# Patient Record
Sex: FEMALE | Race: WHITE | Employment: OTHER | URBAN - METROPOLITAN AREA
[De-identification: names, ages, dates, MRNs, and addresses within clinical notes are randomized per-mention and may not be internally consistent; named-entity substitution may affect disease eponyms.]

---

## 2017-11-29 ENCOUNTER — HOSPITAL ENCOUNTER (OUTPATIENT)
Dept: MAMMOGRAPHY | Age: 62
Discharge: HOME | End: 2017-11-29
Attending: INTERNAL MEDICINE
Payer: COMMERCIAL

## 2017-11-29 DIAGNOSIS — Z12.31 VISIT FOR SCREENING MAMMOGRAM: ICD-10-CM

## 2017-11-29 PROCEDURE — 77067 SCR MAMMO BI INCL CAD: CPT

## 2017-12-01 ENCOUNTER — OFFICE VISIT (OUTPATIENT)
Dept: INTERNAL MEDICINE CLINIC | Age: 62
End: 2017-12-01

## 2017-12-01 VITALS
HEIGHT: 65 IN | OXYGEN SATURATION: 98 % | RESPIRATION RATE: 10 BRPM | SYSTOLIC BLOOD PRESSURE: 140 MMHG | TEMPERATURE: 98.4 F | HEART RATE: 82 BPM | BODY MASS INDEX: 24.16 KG/M2 | WEIGHT: 145 LBS | DIASTOLIC BLOOD PRESSURE: 88 MMHG

## 2017-12-01 DIAGNOSIS — Z11.59 NEED FOR HEPATITIS C SCREENING TEST: ICD-10-CM

## 2017-12-01 DIAGNOSIS — N95.9 MENOPAUSAL PROBLEM: ICD-10-CM

## 2017-12-01 DIAGNOSIS — S92.909D CLOSED FRACTURE OF FOOT WITH ROUTINE HEALING, UNSPECIFIED LATERALITY, SUBSEQUENT ENCOUNTER: ICD-10-CM

## 2017-12-01 DIAGNOSIS — Z12.11 COLON CANCER SCREENING: ICD-10-CM

## 2017-12-01 DIAGNOSIS — N90.4 LICHEN SCLEROSUS OF FEMALE GENITALIA: ICD-10-CM

## 2017-12-01 DIAGNOSIS — Z00.00 ROUTINE GENERAL MEDICAL EXAMINATION AT A HEALTH CARE FACILITY: Primary | ICD-10-CM

## 2017-12-01 RX ORDER — CLOBETASOL PROPIONATE 0.5 MG/G
CREAM TOPICAL
Refills: 6 | COMMUNITY
Start: 2017-11-14 | End: 2020-10-26 | Stop reason: ALTCHOICE

## 2017-12-01 RX ORDER — DOXYCYCLINE HYCLATE 100 MG
TABLET ORAL
Refills: 5 | COMMUNITY
Start: 2017-10-29 | End: 2019-11-25

## 2017-12-01 NOTE — MR AVS SNAPSHOT
Visit Information Date & Time Provider Department Dept. Phone Encounter #  
 12/1/2017  4:00 PM Vikash Snowden MD [facility] Internal Medicine [phone] [id number] Upcoming Health Maintenance Date Due Hepatitis C Screening 1955 COLONOSCOPY 8/11/2015 PAP AKA CERVICAL CYTOLOGY 8/24/2016 Influenza Age 5 to Adult 8/1/2017 BREAST CANCER SCRN MAMMOGRAM 11/29/2019 DTaP/Tdap/Td series (2 - Td) 8/24/2021 Allergies as of 12/1/2017  Review Complete On: 12/1/2017 By: Vikash Snowden MD  
  
 Severity Noted Reaction Type Reactions Morphine  05/21/2014    Nausea Only Zithromax [Azithromycin]  12/16/2009    Nausea Only Current Immunizations  Reviewed on 12/1/2017 Name Date TDAP Vaccine 8/24/2011, 12/17/2009 Zoster Vaccine, Live 12/1/2013 Reviewed by Vikash Snowden MD on 12/1/2017 at  4:37 PM  
 Reviewed by Vikash Snowden MD on 12/1/2017 at  4:38 PM  
You Were Diagnosed With   
  
 Codes Comments Routine general medical examination at a health care facility    -  Primary ICD-10-CM: Z00.00 ICD-9-CM: V70.0 Colon cancer screening     ICD-10-CM: Z12.11 ICD-9-CM: V76.51 Need for hepatitis C screening test     ICD-10-CM: Z11.59 
ICD-9-CM: V73.89 Lichen sclerosus of female genitalia     ICD-10-CM: N90.4 ICD-9-CM: 701.0 Menopause present     ICD-10-CM: Z78.0 ICD-9-CM: 627.2 Closed fracture of foot with routine healing, unspecified laterality, subsequent encounter     ICD-10-CM: S92.909D ICD-9-CM: V54.16 Vitals BP Pulse Temp Resp Height(growth percentile) Weight(growth percentile) 140/88 82 98.4 °F (36.9 °C) (Oral) 10 5' 4.5\" (1.638 m) 145 lb (65.8 kg) SpO2 BMI OB Status Smoking Status 98% 24.5 kg/m2 Postmenopausal Never Smoker Vitals History BMI and BSA Data Body Mass Index Body Surface Area 24.5 kg/m 2 1.73 m 2 Preferred Pharmacy Pharmacy Name Phone [facility] [address] [phone] Your Updated Medication List  
  
   
This list is accurate as of: 12/1/17  4:51 PM.  Always use your most recent med list.  
  
  
  
  
 aspirin delayed-release 81 mg tablet Take 81 mg by mouth daily. B COMPLEX 1 tablet Generic drug:  b complex vitamins Take 1 Tab by mouth daily. clobetasol 0.05 % topical cream  
Commonly known as:  TEMOVATE  
APPLY TOPICALLY TO AFFECTED AREA TWICE DAILY  
  
 doxycycline 100 mg tablet Commonly known as:  VIBRA-TABS TAKE 1 TABLET TWICE A DAY  
  
 VITAMIN D3 1,000 unit tablet Generic drug:  cholecalciferol Take 1,000 Units by mouth daily. We Performed the Following CBC WITH AUTOMATED DIFF [57690 CPT(R)] HEPATITIS C AB [44572 CPT(R)] LIPID PANEL [70624 CPT(R)] METABOLIC PANEL, COMPREHENSIVE [19033 CPT(R)] REFERRAL TO OBSTETRICS AND GYNECOLOGY [REF51 Custom] TSH RFX ON ABNORMAL TO FREE T4 [[id number] Custom] UA/M W/RFLX CULTURE, ROUTINE [[id number] Custom] To-Do List   
 12/01/2017 Imaging:  DEXA BONE DENSITY STUDY AXIAL Referral Information Referral ID Referred By Referred To  
  
 [id number] David Hernandez, [address] [facility] [address] [place], [address] Visits Status Start Date End Date 1 New Request 12/1/17 12/1/18 If your referral has a status of pending review or denied, additional information will be sent to support the outcome of this decision. Referral ID Referred By Referred To  
 [id number] Zack WHITEHEAD Not Available Visits Status Start Date End Date 1 New Request 12/1/17 12/1/18 If your referral has a status of pending review or denied, additional information will be sent to support the outcome of this decision. Introducing [facility]! Dear Russ Mack: Thank you for requesting a giftee account.   Our records indicate that you already have an active Wyle account. You can access your account anytime at [email or web address] Did you know that you can access your hospital and ER discharge instructions at any time in Wyle? You can also review all of your test results from your hospital stay or ER visit. Additional Information If you have questions, please visit the Frequently Asked Questions section of the Wyle website at [email or web address] Remember, Wyle is NOT to be used for urgent needs. For medical emergencies, dial 911. Now available from your iPhone and Android! Please provide this summary of care documentation to your next provider. Your primary care clinician is listed as Lilliam [phone] If you have any questions after today's visit, please call [phone].

## 2017-12-02 NOTE — PROGRESS NOTES
Subjective:   58 y.o. female for Well Woman Check. Her gyne and breast care is done elsewhere by her Ob-Gyne physician. Patient Active Problem List    Diagnosis Date Noted    Pelvic relaxation 05/30/2014    Mixed hyperlipidemia 12/17/2009    Headache(784.0) 12/17/2009     Current Outpatient Prescriptions   Medication Sig Dispense Refill    clobetasol (TEMOVATE) 0.05 % topical cream APPLY TOPICALLY TO AFFECTED AREA TWICE DAILY  6    doxycycline (VIBRA-TABS) 100 mg tablet TAKE 1 TABLET TWICE A DAY  5    b complex vitamins (B COMPLEX 1) tablet Take 1 Tab by mouth daily.  cholecalciferol (VITAMIN D3) 1,000 unit tablet Take 1,000 Units by mouth daily.  aspirin delayed-release 81 mg tablet Take 81 mg by mouth daily.        Allergies   Allergen Reactions    Morphine Nausea Only    Zithromax [Azithromycin] Nausea Only     Past Medical History:   Diagnosis Date    GERD (gastroesophageal reflux disease)     HX    Headache(784.0) 12/17/2009    Mixed hyperlipidemia 12/17/2009    Rosacea 2017     Past Surgical History:   Procedure Laterality Date    HX BLADDER SUSPENSION  5/2014    HX DILATION AND CURETTAGE      HX TUBAL LIGATION      TITA US BX BREAST LT 1ST LESION W/CLIP AND SPECIMEN Left 12/09/2016    benign     Family History   Problem Relation Age of Onset    Heart Disease Mother      MI   [employer] Cancer Father      Unknown primary    Anesth Problems Neg Hx      Social History   Substance Use Topics    Smoking status: Never Smoker    Smokeless tobacco: Never Used    Alcohol use 1.0 oz/week     2 Glasses of wine per week      Comment: WEEKLY        Lab Results   Component Value Date/Time    WBC 11.9 06/03/2014 06:27 AM    HGB 11.9 06/03/2014 06:27 AM    HCT 35.9 06/03/2014 06:27 AM    PLATELET 550 77/54/2790 06:27 AM    MCV 91.1 06/03/2014 06:27 AM     Lab Results   Component Value Date/Time    Cholesterol, total 202 06/02/2015 09:38 AM    HDL Cholesterol 46 06/02/2015 09:38 AM    LDL, calculated 132 06/02/2015 09:38 AM    Triglyceride 121 06/02/2015 09:38 AM    CHOL/HDL Ratio 3.9 12/18/2009 07:45 AM     Lab Results   Component Value Date/Time    ALT (SGPT) 15 12/22/2014 02:28 PM    AST (SGOT) 17 12/22/2014 02:28 PM    Alk. phosphatase 83 12/22/2014 02:28 PM    Bilirubin, total 0.8 12/22/2014 02:28 PM    Albumin 4.4 12/22/2014 02:28 PM    Protein, total 6.8 12/22/2014 02:28 PM    PLATELET 740 93/03/2932 06:27 AM       Lab Results   Component Value Date/Time    GFR est non-AA 92 12/22/2014 02:28 PM    GFR est  12/22/2014 02:28 PM    Creatinine 0.72 12/22/2014 02:28 PM    BUN 11 12/22/2014 02:28 PM    Sodium 143 12/22/2014 02:28 PM    Potassium 4.2 12/22/2014 02:28 PM    Chloride 100 12/22/2014 02:28 PM    CO2 24 12/22/2014 02:28 PM    Magnesium 2.1 12/22/2014 02:28 PM     Lab Results   Component Value Date/Time    TSH 1.700 12/22/2014 02:31 PM    T4, Total 9.1 12/22/2014 02:31 PM      Lab Results   Component Value Date/Time    Glucose 90 12/22/2014 02:28 PM                           Specific concerns today: Her recent foot fracture is well healed. Seeing the GYN regularly as well as up to date on her colonoscopy. .    Review of Systems  A comprehensive review of systems was negative except for that written in the HPI. Objective:   Blood pressure 140/88, pulse 82, temperature 98.4 °F (36.9 °C), temperature source Oral, resp. rate 10, height 5' 4.5\" (1.638 m), weight 145 lb (65.8 kg), SpO2 98 %.    Physical Examination:   General appearance - alert, well appearing, and in no distress  Eyes - pupils equal and reactive, extraocular eye movements intact  Ears - bilateral TM's and external ear canals normal  Nose - normal and patent, no erythema, discharge or polyps  Mouth - mucous membranes moist, pharynx normal without lesions  Neck - supple, no significant adenopathy  Lymphatics - no palpable lymphadenopathy, no hepatosplenomegaly  Chest - clear to auscultation, no wheezes, rales or rhonchi, symmetric air entry  Heart - normal rate, regular rhythm, normal S1, S2, no murmurs, rubs, clicks or gallops  Abdomen - soft, nontender, nondistended, no masses or organomegaly  Back exam - full range of motion, no tenderness, palpable spasm or pain on motion  Neurological - alert, oriented, normal speech, no focal findings or movement disorder noted  Musculoskeletal - no joint tenderness, deformity or swelling  Extremities - peripheral pulses normal, no pedal edema, no clubbing or cyanosis     Assessment/Plan:     lose weight, increase physical activity, routine labs ordered  Diagnoses and all orders for this visit:    1. Routine general medical examination at a health care facility  -     CBC WITH AUTOMATED DIFF  -     METABOLIC PANEL, COMPREHENSIVE  -     LIPID PANEL  -     TSH RFX ON ABNORMAL TO FREE T4  -     UA/M W/RFLX CULTURE, ROUTINE  -     HEPATITIS C AB    2. Colon cancer screening    3. Need for hepatitis C screening test    4. Lichen sclerosus of female genitalia  -     REFERRAL TO OBSTETRICS AND GYNECOLOGY    5. Closed fracture of foot with routine healing, unspecified laterality, subsequent encounter    6. Menopausal problem  -     DEXA BONE DENSITY STUDY AXIAL; Future       Follow-up Disposition: 12 months and as needed. Discussed new Shingrix and will get it when available. Advised her to call back or return to office if symptoms worsen/change/persist.  Discussed expected course/resolution/complications of diagnosis in detail with patient. Medication risks/benefits/costs/interactions/alternatives discussed with patient. She was given an after visit summary which includes diagnoses, current medications, & vitals. She expressed understanding with the diagnosis and plan.

## 2017-12-06 LAB
ALBUMIN SERPL-MCNC: 4 G/DL (ref 3.6–4.8)
ALBUMIN/GLOB SERPL: 1.9 {RATIO} (ref 1.2–2.2)
ALP SERPL-CCNC: 82 IU/L (ref 39–117)
ALT SERPL-CCNC: 20 IU/L (ref 0–32)
APPEARANCE UR: CLEAR
AST SERPL-CCNC: 19 IU/L (ref 0–40)
BACTERIA #/AREA URNS HPF: NORMAL /[HPF]
BASOPHILS # BLD AUTO: 0 X10E3/UL (ref 0–0.2)
BASOPHILS NFR BLD AUTO: 1 %
BILIRUB SERPL-MCNC: 0.8 MG/DL (ref 0–1.2)
BILIRUB UR QL STRIP: NEGATIVE
BUN SERPL-MCNC: 14 MG/DL (ref 8–27)
BUN/CREAT SERPL: 20 (ref 12–28)
CALCIUM SERPL-MCNC: 9.1 MG/DL (ref 8.7–10.3)
CASTS URNS QL MICRO: NORMAL /LPF
CHLORIDE SERPL-SCNC: 101 MMOL/L (ref 96–106)
CHOLEST SERPL-MCNC: 169 MG/DL (ref 100–199)
CO2 SERPL-SCNC: 25 MMOL/L (ref 18–29)
COLOR UR: YELLOW
CREAT SERPL-MCNC: 0.69 MG/DL (ref 0.57–1)
EOSINOPHIL # BLD AUTO: 0.2 X10E3/UL (ref 0–0.4)
EOSINOPHIL NFR BLD AUTO: 3 %
EPI CELLS #/AREA URNS HPF: NORMAL /HPF
ERYTHROCYTE [DISTWIDTH] IN BLOOD BY AUTOMATED COUNT: 14.1 % (ref 12.3–15.4)
GFR SERPLBLD CREATININE-BSD FMLA CKD-EPI: 108 ML/MIN/1.73
GFR SERPLBLD CREATININE-BSD FMLA CKD-EPI: 94 ML/MIN/1.73
GLOBULIN SER CALC-MCNC: 2.1 G/DL (ref 1.5–4.5)
GLUCOSE SERPL-MCNC: 87 MG/DL (ref 65–99)
GLUCOSE UR QL: NEGATIVE
HCT VFR BLD AUTO: 39.9 % (ref 34–46.6)
HCV AB S/CO SERPL IA: <0.1 S/CO RATIO (ref 0–0.9)
HDLC SERPL-MCNC: 46 MG/DL
HGB BLD-MCNC: 13.3 G/DL (ref 11.1–15.9)
HGB UR QL STRIP: ABNORMAL
IMM GRANULOCYTES # BLD: 0 X10E3/UL (ref 0–0.1)
IMM GRANULOCYTES NFR BLD: 0 %
KETONES UR QL STRIP: NEGATIVE
LDLC SERPL CALC-MCNC: 107 MG/DL (ref 0–99)
LEUKOCYTE ESTERASE UR QL STRIP: NEGATIVE
LYMPHOCYTES # BLD AUTO: 2 X10E3/UL (ref 0.7–3.1)
LYMPHOCYTES NFR BLD AUTO: 29 %
MCH RBC QN AUTO: 30.6 PG (ref 26.6–33)
MCHC RBC AUTO-ENTMCNC: 33.3 G/DL (ref 31.5–35.7)
MCV RBC AUTO: 92 FL (ref 79–97)
MICRO URNS: ABNORMAL
MONOCYTES # BLD AUTO: 0.4 X10E3/UL (ref 0.1–0.9)
MONOCYTES NFR BLD AUTO: 6 %
MUCOUS THREADS URNS QL MICRO: PRESENT
NEUTROPHILS # BLD AUTO: 4.3 X10E3/UL (ref 1.4–7)
NEUTROPHILS NFR BLD AUTO: 61 %
NITRITE UR QL STRIP: NEGATIVE
PH UR STRIP: 7 [PH] (ref 5–7.5)
PLATELET # BLD AUTO: 221 X10E3/UL (ref 150–379)
POTASSIUM SERPL-SCNC: 4.2 MMOL/L (ref 3.5–5.2)
PROT SERPL-MCNC: 6.1 G/DL (ref 6–8.5)
PROT UR QL STRIP: NEGATIVE
RBC # BLD AUTO: 4.35 X10E6/UL (ref 3.77–5.28)
RBC #/AREA URNS HPF: NORMAL /HPF
SODIUM SERPL-SCNC: 141 MMOL/L (ref 134–144)
SP GR UR: 1.01 (ref 1–1.03)
TRIGL SERPL-MCNC: 80 MG/DL (ref 0–149)
TSH SERPL DL<=0.005 MIU/L-ACNC: 2.68 UIU/ML (ref 0.45–4.5)
URINALYSIS REFLEX, 377202: ABNORMAL
UROBILINOGEN UR STRIP-MCNC: 0.2 MG/DL (ref 0.2–1)
VLDLC SERPL CALC-MCNC: 16 MG/DL (ref 5–40)
WBC # BLD AUTO: 7 X10E3/UL (ref 3.4–10.8)
WBC #/AREA URNS HPF: NORMAL /HPF

## 2017-12-13 ENCOUNTER — HOSPITAL ENCOUNTER (OUTPATIENT)
Dept: MAMMOGRAPHY | Age: 62
Discharge: HOME | End: 2017-12-13
Attending: INTERNAL MEDICINE
Payer: COMMERCIAL

## 2017-12-13 DIAGNOSIS — N95.9 MENOPAUSAL PROBLEM: ICD-10-CM

## 2017-12-13 PROCEDURE — 77080 DXA BONE DENSITY AXIAL: CPT

## 2018-05-01 ENCOUNTER — PATIENT MESSAGE (OUTPATIENT)
Dept: INTERNAL MEDICINE CLINIC | Age: 63
End: 2018-05-01

## 2018-05-01 DIAGNOSIS — R31.9 HEMATURIA, UNSPECIFIED TYPE: Primary | ICD-10-CM

## 2018-05-01 NOTE — TELEPHONE ENCOUNTER
From: Malgorzata Boudreaux  Sent: 5/1/2018 11:01 AM EDT  To: Nilton Leach [facility]  Subject: RE: Non-Urgent Medical Question    Sounds good. Let me know and I will stop by. On another note, I have gone a couple more times this morning and the urine is clear. Thanks for your response. Nu  ----- Message -----  From: Mor Lord MD  Sent: 5/1/2018 10:26 AM EDT  To: Yennifer Albarran. [place]  Subject: RE: Non-Urgent Medical Question    At least come by and give a urine specimen. Let me know and I will prepare a lab slip. Barb Barnett    ----- Message -----   From: Elisabet Solis Lisbeth Darnells: 5/1/2018 7:33 AM EDT   To: Mor Lord MD  Subject: Non-Urgent Medical Question    Hi Dr. Shahana Roberts, late in the day, I noticed blood in my urine, enough to make it a pink color. This occurred two times in a row when I went to the bathroom. I have gone a few times since that time and my urine has been clear. I am not having any other symptoms. I am not having any burning when I urinate and no other pain. I was pretty active yesterday working in the yard, so my back was tired but not hurting. Should I make an appointment to come see you?      Thanks    Osmany  [phone]

## 2018-05-02 LAB
APPEARANCE UR: CLEAR
BACTERIA #/AREA URNS HPF: ABNORMAL /[HPF]
BILIRUB UR QL STRIP: NEGATIVE
CASTS URNS QL MICRO: ABNORMAL /LPF
COLOR UR: YELLOW
CRYSTALS URNS MICRO: ABNORMAL
EPI CELLS #/AREA URNS HPF: ABNORMAL /HPF
GLUCOSE UR QL: NEGATIVE
HGB UR QL STRIP: ABNORMAL
KETONES UR QL STRIP: NEGATIVE
LEUKOCYTE ESTERASE UR QL STRIP: NEGATIVE
MICRO URNS: ABNORMAL
MUCOUS THREADS URNS QL MICRO: PRESENT
NITRITE UR QL STRIP: NEGATIVE
PH UR STRIP: 6.5 [PH] (ref 5–7.5)
PROT UR QL STRIP: NEGATIVE
RBC #/AREA URNS HPF: ABNORMAL /HPF
SP GR UR: 1.02 (ref 1–1.03)
UNIDENT CRYS URNS QL MICRO: PRESENT
URINALYSIS REFLEX, 377202: ABNORMAL
UROBILINOGEN UR STRIP-MCNC: 0.2 MG/DL (ref 0.2–1)
WBC #/AREA URNS HPF: ABNORMAL /HPF

## 2018-05-04 ENCOUNTER — HOSPITAL ENCOUNTER (EMERGENCY)
Age: 63
Discharge: HOME | End: 2018-05-04
Attending: STUDENT IN AN ORGANIZED HEALTH CARE EDUCATION/TRAINING PROGRAM
Payer: COMMERCIAL

## 2018-05-04 ENCOUNTER — TELEPHONE (OUTPATIENT)
Dept: INTERNAL MEDICINE CLINIC | Age: 63
End: 2018-05-04

## 2018-05-04 ENCOUNTER — APPOINTMENT (OUTPATIENT)
Dept: GENERAL RADIOLOGY | Age: 63
End: 2018-05-04
Attending: PHYSICIAN ASSISTANT
Payer: COMMERCIAL

## 2018-05-04 VITALS
WEIGHT: 143 LBS | TEMPERATURE: 98.8 F | DIASTOLIC BLOOD PRESSURE: 93 MMHG | OXYGEN SATURATION: 99 % | HEART RATE: 82 BPM | BODY MASS INDEX: 23.82 KG/M2 | HEIGHT: 65 IN | SYSTOLIC BLOOD PRESSURE: 148 MMHG | RESPIRATION RATE: 16 BRPM

## 2018-05-04 DIAGNOSIS — R07.89 ATYPICAL CHEST PAIN: ICD-10-CM

## 2018-05-04 DIAGNOSIS — R03.0 ELEVATED BLOOD PRESSURE READING: Primary | ICD-10-CM

## 2018-05-04 LAB
ALBUMIN SERPL-MCNC: 4 G/DL (ref 3.5–5)
ALBUMIN/GLOB SERPL: 1.3 {RATIO} (ref 1.1–2.2)
ALP SERPL-CCNC: 89 U/L (ref 45–117)
ALT SERPL-CCNC: 21 U/L (ref 12–78)
ANION GAP SERPL CALC-SCNC: 7 MMOL/L (ref 5–15)
APPEARANCE UR: CLEAR
AST SERPL-CCNC: 15 U/L (ref 15–37)
ATRIAL RATE: 84 BPM
BACTERIA URNS QL MICRO: NEGATIVE /HPF
BASOPHILS # BLD: 0.1 K/UL (ref 0–0.1)
BASOPHILS NFR BLD: 1 % (ref 0–1)
BILIRUB SERPL-MCNC: 1.2 MG/DL (ref 0.2–1)
BILIRUB UR QL: NEGATIVE
BUN SERPL-MCNC: 17 MG/DL (ref 6–20)
BUN/CREAT SERPL: 25 (ref 12–20)
CALCIUM SERPL-MCNC: 9.3 MG/DL (ref 8.5–10.1)
CALCULATED P AXIS, ECG09: 65 DEGREES
CALCULATED R AXIS, ECG10: 41 DEGREES
CALCULATED T AXIS, ECG11: 52 DEGREES
CHLORIDE SERPL-SCNC: 107 MMOL/L (ref 97–108)
CO2 SERPL-SCNC: 29 MMOL/L (ref 21–32)
COLOR UR: NORMAL
CREAT SERPL-MCNC: 0.68 MG/DL (ref 0.55–1.02)
DIAGNOSIS, 93000: NORMAL
DIFFERENTIAL METHOD BLD: NORMAL
EOSINOPHIL # BLD: 0.2 K/UL (ref 0–0.4)
EOSINOPHIL NFR BLD: 2 % (ref 0–7)
EPITH CASTS URNS QL MICRO: NORMAL /LPF
ERYTHROCYTE [DISTWIDTH] IN BLOOD BY AUTOMATED COUNT: 13.8 % (ref 11.5–14.5)
GLOBULIN SER CALC-MCNC: 3.2 G/DL (ref 2–4)
GLUCOSE SERPL-MCNC: 100 MG/DL (ref 65–100)
GLUCOSE UR STRIP.AUTO-MCNC: NEGATIVE MG/DL
HCT VFR BLD AUTO: 42.5 % (ref 35–47)
HGB BLD-MCNC: 14 G/DL (ref 11.5–16)
HGB UR QL STRIP: NEGATIVE
HYALINE CASTS URNS QL MICRO: NORMAL /LPF (ref 0–5)
IMM GRANULOCYTES # BLD: 0 K/UL (ref 0–0.04)
IMM GRANULOCYTES NFR BLD AUTO: 0 % (ref 0–0.5)
KETONES UR QL STRIP.AUTO: NEGATIVE MG/DL
LEUKOCYTE ESTERASE UR QL STRIP.AUTO: NEGATIVE
LIPASE SERPL-CCNC: 101 U/L (ref 73–393)
LYMPHOCYTES # BLD: 1.6 K/UL (ref 0.8–3.5)
LYMPHOCYTES NFR BLD: 20 % (ref 12–49)
MCH RBC QN AUTO: 30.6 PG (ref 26–34)
MCHC RBC AUTO-ENTMCNC: 32.9 G/DL (ref 30–36.5)
MCV RBC AUTO: 92.8 FL (ref 80–99)
MONOCYTES # BLD: 0.6 K/UL (ref 0–1)
MONOCYTES NFR BLD: 7 % (ref 5–13)
NEUTS SEG # BLD: 5.8 K/UL (ref 1.8–8)
NEUTS SEG NFR BLD: 71 % (ref 32–75)
NITRITE UR QL STRIP.AUTO: NEGATIVE
NRBC # BLD: 0 K/UL (ref 0–0.01)
NRBC BLD-RTO: 0 PER 100 WBC
P-R INTERVAL, ECG05: 138 MS
PH UR STRIP: 6.5 [PH] (ref 5–8)
PLATELET # BLD AUTO: 202 K/UL (ref 150–400)
PMV BLD AUTO: 12.2 FL (ref 8.9–12.9)
POTASSIUM SERPL-SCNC: 3.3 MMOL/L (ref 3.5–5.1)
PROT SERPL-MCNC: 7.2 G/DL (ref 6.4–8.2)
PROT UR STRIP-MCNC: NEGATIVE MG/DL
Q-T INTERVAL, ECG07: 376 MS
QRS DURATION, ECG06: 88 MS
QTC CALCULATION (BEZET), ECG08: 444 MS
RBC # BLD AUTO: 4.58 M/UL (ref 3.8–5.2)
RBC #/AREA URNS HPF: NORMAL /HPF (ref 0–5)
SODIUM SERPL-SCNC: 143 MMOL/L (ref 136–145)
SP GR UR REFRACTOMETRY: 1.01 (ref 1–1.03)
TROPONIN I SERPL-MCNC: <0.04 NG/ML
UR CULT HOLD, URHOLD: NORMAL
UROBILINOGEN UR QL STRIP.AUTO: 0.2 EU/DL (ref 0.2–1)
VENTRICULAR RATE, ECG03: 84 BPM
WBC # BLD AUTO: 8.2 K/UL (ref 3.6–11)
WBC URNS QL MICRO: NORMAL /HPF (ref 0–4)

## 2018-05-04 PROCEDURE — 84484 ASSAY OF TROPONIN QUANT: CPT | Performed by: PHYSICIAN ASSISTANT

## 2018-05-04 PROCEDURE — 83690 ASSAY OF LIPASE: CPT | Performed by: PHYSICIAN ASSISTANT

## 2018-05-04 PROCEDURE — 74011250637 HC RX REV CODE- 250/637: Performed by: STUDENT IN AN ORGANIZED HEALTH CARE EDUCATION/TRAINING PROGRAM

## 2018-05-04 PROCEDURE — 81001 URINALYSIS AUTO W/SCOPE: CPT | Performed by: PHYSICIAN ASSISTANT

## 2018-05-04 PROCEDURE — 99284 EMERGENCY DEPT VISIT MOD MDM: CPT

## 2018-05-04 PROCEDURE — 85025 COMPLETE CBC W/AUTO DIFF WBC: CPT | Performed by: PHYSICIAN ASSISTANT

## 2018-05-04 PROCEDURE — 71046 X-RAY EXAM CHEST 2 VIEWS: CPT

## 2018-05-04 PROCEDURE — 80053 COMPREHEN METABOLIC PANEL: CPT | Performed by: PHYSICIAN ASSISTANT

## 2018-05-04 PROCEDURE — 93005 ELECTROCARDIOGRAM TRACING: CPT

## 2018-05-04 PROCEDURE — 36415 COLL VENOUS BLD VENIPUNCTURE: CPT | Performed by: PHYSICIAN ASSISTANT

## 2018-05-04 RX ORDER — ASPIRIN 325 MG
325 TABLET ORAL ONCE
Status: COMPLETED | OUTPATIENT
Start: 2018-05-04 | End: 2018-05-04

## 2018-05-04 RX ORDER — CLONIDINE HYDROCHLORIDE 0.1 MG/1
0.2 TABLET ORAL
Status: COMPLETED | OUTPATIENT
Start: 2018-05-04 | End: 2018-05-04

## 2018-05-04 RX ADMIN — CLONIDINE HYDROCHLORIDE 0.2 MG: 0.1 TABLET ORAL at 15:19

## 2018-05-04 RX ADMIN — ASPIRIN 325 MG: 325 TABLET ORAL at 15:19

## 2018-05-04 NOTE — ED PROVIDER NOTES
HPI Comments: 58 y.o. female with past medical history significant for mixed hyperlipidemia, GERD, and rosacea who presents from home with chief complaint of chest discomfort. Patient reports having some constant chest discomfort for 2 days. She describes the discomfort as a \"heaviness\" and states that it is \"similar to indigestion\". However, patient reports taking Tums without any relief. Patient states that the discomfort is non-pleuritic, non-exertional, and does not worsen with positional changes. She reports being seen by her PCP on Tuesday (3 days ago) after having hematuria for a couple days and had labs drawn. Since Tuesday, patient reports checking her blood pressure frequently and states that it has been slightly higher than normal (140/80). Today, patient states that her blood pressure was 154/100, which is much higher than usual. She denies having a formal diagnosis of hypertension. Patient complains of intermittent right sided headaches and states that they are usually present in the morning and relieve throughout the day. She reports that these headaches seem similar to sinus headaches and endorses recent seasonal allergies. Patient denies having any chest pain, leg swelling, or SOB. She denies recent travel. There are no other acute medical concerns at this time. Social hx: never smoker, + EtOH use (1 oz/wk), no drug use  PCP: Lin Hernández MD    Note written by Saba Holliday, as dictated by Luis Meyers MD 2:38 PM      The history is provided by the patient. No  was used.         Past Medical History:   Diagnosis Date    Fracture 2016    Right foot fx (pt turned foot while stepping off curb)    GERD (gastroesophageal reflux disease)     HX    Headache(784.0) 12/17/2009    Mixed hyperlipidemia 12/17/2009    Rosacea 2017       Past Surgical History:   Procedure Laterality Date    HX BLADDER SUSPENSION  5/2014    HX DILATION AND CURETTAGE      HX TUBAL LIGATION      TITA US BX BREAST LT 1ST LESION W/CLIP AND SPECIMEN Left 12/09/2016    benign         Family History:   Problem Relation Age of Onset    Heart Disease Mother      [facility] Cancer Father      Unknown primary    Anesth Problems Neg Hx        Social History     Social History    Marital status:      Spouse name: N/A    Number of children: N/A    Years of education: N/A     Occupational History    Not on file. Social History Main Topics    Smoking status: Never Smoker    Smokeless tobacco: Never Used    Alcohol use 1.0 oz/week     2 Glasses of wine per week      Comment: WEEKLY    Drug use: No    Sexual activity: Yes     Partners: Male     Other Topics Concern    Not on file     Social History Narrative         ALLERGIES: Morphine and Zithromax [azithromycin]    Review of Systems   Constitutional: Negative for chills and fever. HENT: Negative for sore throat. Respiratory: Negative for cough and shortness of breath. Cardiovascular: Negative for chest pain, palpitations and leg swelling. Chest discomfort   Gastrointestinal: Negative for abdominal pain and vomiting. Genitourinary: Negative for dysuria. Musculoskeletal: Negative for back pain. Skin: Negative for rash. Neurological: Negative for syncope and headaches. Psychiatric/Behavioral: Negative for confusion. All other systems reviewed and are negative. Vitals:    05/04/18 1402   BP: (!) 170/120   Pulse: 94   Resp: 15   Temp: 98.7 °F (37.1 °C)   SpO2: 98%   Weight: 64.9 kg (143 lb)   Height: 5' 4.5\" (1.638 m)            Physical Exam   Constitutional: She is oriented to person, place, and time. She appears well-developed. No distress. HENT:   Head: Normocephalic and atraumatic. Eyes: Conjunctivae and EOM are normal. Pupils are equal, round, and reactive to light. Neck: Normal range of motion. Neck supple. Cardiovascular: Normal rate, regular rhythm and normal heart sounds.     No murmur heard.  Pulmonary/Chest: Effort normal and breath sounds normal. No respiratory distress. Abdominal: Soft. Bowel sounds are normal. She exhibits no distension. There is no tenderness. There is no rebound. Musculoskeletal: Normal range of motion. She exhibits no edema. Neurological: She is alert and oriented to person, place, and time. No cranial nerve deficit. She exhibits normal muscle tone. Coordination normal.   Skin: Skin is warm and dry. No rash noted. Psychiatric: She has a normal mood and affect. Her behavior is normal.   Nursing note and vitals reviewed. Note written by Gerhardt Massa, Scribe, as dictated by Jason Mckeon MD 2:38 PM    [facility]      ED Course       Procedures  ED EKG interpretation:  Rhythm: normal sinus rhythm; and regular . Rate (approx.): 84; Axis: normal; ST/T wave: normal.  Note written by Gerhardt Massa, Scribe, as dictated by Jason Mckeon MD 2:08 PM      The patient is resting comfortably and feels better, is alert and in no distress. The repeat examination is unremarkable and benign. The electrocardiogram shows no signs of acute ischemia and the history, exam, diagnostic testing and current condition do not suggest that this patient is having an acute myocardial infarction (negative troponin after constant symptoms for 2 days), significant arrhythmia, unstable angina (low HEART score), esophageal perforation, pulmonary embolism, aortic dissection, pneumothorax, severe pneumonia, sepsis or other significant pathology that would warrant further testing, continued ED treatment, admission, or cardiology or other specialist consultation at this point. The vital signs have been stable. No evidence of end-organ damage. The patient's condition is stable and appropriate for discharge.  The patient will pursue further outpatient evaluation with the primary care physician, other designated physician or cardiologist. The patient and/or caregivers have expressed a clear and thorough understanding and agree to follow up as instructed.

## 2018-05-04 NOTE — ED NOTES
1:59 PM  I have evaluated the patient as the Provider in Triage. I have reviewed Her vital signs and the triage nurse assessment. I have talked with the patient and any available family and advised that I am the provider in triage and have ordered the appropriate study to initiate their work up based on the clinical presentation during my assessment. I have advised that the patient will be accommodated in the Main ED as soon as possible. I have also requested to contact the triage nurse or myself immediately if the patient experiences any changes in their condition during this brief waiting period. Chest tightness for a few days. Elevated BP at home. States that symptoms are improved with drinking water. Pt notes that she felt a little SOB when she went up the steps today but was able to mow the lawn yesterday without symptoms. Patient denies hx VTE, exogenous estrogen use, hemoptysis, unilateral leg pain/swelling.        HUAN Barba

## 2018-05-04 NOTE — TELEPHONE ENCOUNTER
Pt calling office with c/o left side chest pain that has been constant today. She has had \"indigestion\" for the ~2-3 days as well. BP elevated 154/100,174/100 with temporal headaches. Pt denies SOB, jaw pain, etc. To ED for eval with these s/sx. She states she will go to [facility] [place].

## 2018-05-04 NOTE — DISCHARGE INSTRUCTIONS
Elevated Blood Pressure: Care Instructions  Your Care Instructions    Blood pressure is a measure of how hard the blood pushes against the walls of your arteries. It's normal for blood pressure to go up and down throughout the day. But if it stays up over time, you have high blood pressure. Two numbers tell you your blood pressure. The first number is the systolic pressure. It shows how hard the blood pushes when your heart is pumping. The second number is the diastolic pressure. It shows how hard the blood pushes between heartbeats, when your heart is relaxed and filling with blood. An ideal blood pressure in adults is less than 120/80 (say \"120 over 80\"). High blood pressure is 140/90 or higher. You have high blood pressure if your top number is 140 or higher or your bottom number is 90 or higher, or both. The main test for high blood pressure is simple, fast, and painless. To diagnose high blood pressure, your doctor will test your blood pressure at different times. After testing your blood pressure, your doctor may ask you to test it again when you are home. If you are diagnosed with high blood pressure, you can work with your doctor to make a long-term plan to manage it. Follow-up care is a key part of your treatment and safety. Be sure to make and go to all appointments, and call your doctor if you are having problems. It's also a good idea to know your test results and keep a list of the medicines you take. How can you care for yourself at home? · Do not smoke. Smoking increases your risk for heart attack and stroke. If you need help quitting, talk to your doctor about stop-smoking programs and medicines. These can increase your chances of quitting for good. · Stay at a healthy weight. · Try to limit how much sodium you eat to less than 2,300 milligrams (mg) a day. Your doctor may ask you to try to eat less than 1,500 mg a day. · Be physically active.  Get at least 30 minutes of exercise on most days of the week. Walking is a good choice. You also may want to do other activities, such as running, swimming, cycling, or playing tennis or team sports. · Avoid or limit alcohol. Talk to your doctor about whether you can drink any alcohol. · Eat plenty of fruits, vegetables, and low-fat dairy products. Eat less saturated and total fats. · Learn how to check your blood pressure at home. When should you call for help? Call your doctor now or seek immediate medical care if:  ? · Your blood pressure is much higher than normal (such as 180/110 or higher). ? · You think high blood pressure is causing symptoms such as:  ¨ Severe headache. ¨ Blurry vision. ? Watch closely for changes in your health, and be sure to contact your doctor if:  ? · You do not get better as expected. Where can you learn more? Go to http://miltonApprovavahid.info/. Enter O643 in the search box to learn more about \"Elevated Blood Pressure: Care Instructions. \"  Current as of: September 21, 2016  Content Version: 11.4  © 3617-8367 [employer]. Care instructions adapted under license by [employer] (which disclaims liability or warranty for this information). If you have questions about a medical condition or this instruction, always ask your healthcare professional. [address] any warranty or liability for your use of this information. Chest Pain: Care Instructions  Your Care Instructions    There are many things that can cause chest pain. Some are not serious and will get better on their own in a few days. But some kinds of chest pain need more testing and treatment. Your doctor may have recommended a follow-up visit in the next 8 to 12 hours. If you are not getting better, you may need more tests or treatment. Even though your doctor has released you, you still need to watch for any problems.  The doctor carefully checked you, but sometimes problems can develop later. If you have new symptoms or if your symptoms do not get better, get medical care right away. If you have worse or different chest pain or pressure that lasts more than 5 minutes or you passed out (lost consciousness), call 911 or seek other emergency help right away. A medical visit is only one step in your treatment. Even if you feel better, you still need to do what your doctor recommends, such as going to all suggested follow-up appointments and taking medicines exactly as directed. This will help you recover and help prevent future problems. How can you care for yourself at home? · Rest until you feel better. · Take your medicine exactly as prescribed. Call your doctor if you think you are having a problem with your medicine. · Do not drive after taking a prescription pain medicine. When should you call for help? Call 911 if:  ? · You passed out (lost consciousness). ? · You have severe difficulty breathing. ? · You have symptoms of a heart attack. These may include:  ¨ Chest pain or pressure, or a strange feeling in your chest.  ¨ Sweating. ¨ Shortness of breath. ¨ Nausea or vomiting. ¨ Pain, pressure, or a strange feeling in your back, neck, jaw, or upper belly or in one or both shoulders or arms. ¨ Lightheadedness or sudden weakness. ¨ A fast or irregular heartbeat. After you call 911, the  may tell you to chew 1 adult-strength or 2 to 4 low-dose aspirin. Wait for an ambulance. Do not try to drive yourself. ?Call your doctor today if:  ? · You have any trouble breathing. ? · Your chest pain gets worse. ? · You are dizzy or lightheaded, or you feel like you may faint. ? · You are not getting better as expected. ? · You are having new or different chest pain. Where can you learn more? Go to http://milton-vahid.info/. Enter A120 in the search box to learn more about \"Chest Pain: Care Instructions. \"  Current as of: March 20, 2017  Content Version: 11.4  © 6242-7191 [employer]. Care instructions adapted under license by [employer] (which disclaims liability or warranty for this information). If you have questions about a medical condition or this instruction, always ask your healthcare professional. [address] any warranty or liability for your use of this information.

## 2018-05-05 PROCEDURE — 99284 EMERGENCY DEPT VISIT MOD MDM: CPT

## 2018-05-06 ENCOUNTER — APPOINTMENT (OUTPATIENT)
Dept: CT IMAGING | Age: 63
End: 2018-05-06
Attending: EMERGENCY MEDICINE
Payer: COMMERCIAL

## 2018-05-06 ENCOUNTER — HOSPITAL ENCOUNTER (OUTPATIENT)
Age: 63
Setting detail: OBSERVATION
Discharge: HOME | End: 2018-05-06
Attending: EMERGENCY MEDICINE | Admitting: INTERNAL MEDICINE
Payer: COMMERCIAL

## 2018-05-06 ENCOUNTER — APPOINTMENT (OUTPATIENT)
Dept: GENERAL RADIOLOGY | Age: 63
End: 2018-05-06
Attending: INTERNAL MEDICINE
Payer: COMMERCIAL

## 2018-05-06 VITALS
RESPIRATION RATE: 14 BRPM | SYSTOLIC BLOOD PRESSURE: 146 MMHG | OXYGEN SATURATION: 97 % | WEIGHT: 144.4 LBS | DIASTOLIC BLOOD PRESSURE: 94 MMHG | HEART RATE: 68 BPM | BODY MASS INDEX: 24.65 KG/M2 | TEMPERATURE: 98.4 F | HEIGHT: 64 IN

## 2018-05-06 DIAGNOSIS — R07.9 CHEST PAIN, UNSPECIFIED TYPE: Primary | ICD-10-CM

## 2018-05-06 PROBLEM — K21.9 GASTROESOPHAGEAL REFLUX DISEASE WITHOUT ESOPHAGITIS: Status: ACTIVE | Noted: 2018-05-06

## 2018-05-06 PROBLEM — I10 ESSENTIAL HYPERTENSION: Status: ACTIVE | Noted: 2018-05-06

## 2018-05-06 PROBLEM — R00.2 INTERMITTENT PALPITATIONS: Status: ACTIVE | Noted: 2018-05-06

## 2018-05-06 LAB
ALBUMIN SERPL-MCNC: 3.7 G/DL (ref 3.5–5)
ALBUMIN/GLOB SERPL: 1.4 {RATIO} (ref 1.1–2.2)
ALP SERPL-CCNC: 79 U/L (ref 45–117)
ALT SERPL-CCNC: 19 U/L (ref 12–78)
AMYLASE SERPL-CCNC: 40 U/L (ref 25–115)
ANION GAP SERPL CALC-SCNC: 7 MMOL/L (ref 5–15)
APPEARANCE UR: CLEAR
AST SERPL-CCNC: 12 U/L (ref 15–37)
ATRIAL RATE: 71 BPM
BACTERIA URNS QL MICRO: NEGATIVE /HPF
BASOPHILS # BLD: 0.1 K/UL (ref 0–0.1)
BASOPHILS NFR BLD: 1 % (ref 0–1)
BILIRUB SERPL-MCNC: 0.8 MG/DL (ref 0.2–1)
BILIRUB UR QL: NEGATIVE
BUN SERPL-MCNC: 20 MG/DL (ref 6–20)
BUN/CREAT SERPL: 25 (ref 12–20)
CALCIUM SERPL-MCNC: 8.7 MG/DL (ref 8.5–10.1)
CALCULATED P AXIS, ECG09: 32 DEGREES
CALCULATED R AXIS, ECG10: 22 DEGREES
CALCULATED T AXIS, ECG11: 34 DEGREES
CHLORIDE SERPL-SCNC: 108 MMOL/L (ref 97–108)
CK MB CFR SERPL CALC: NORMAL % (ref 0–2.5)
CK MB SERPL-MCNC: <1 NG/ML (ref 5–25)
CK SERPL-CCNC: 78 U/L (ref 26–192)
CO2 SERPL-SCNC: 29 MMOL/L (ref 21–32)
COLOR UR: NORMAL
CREAT SERPL-MCNC: 0.8 MG/DL (ref 0.55–1.02)
D DIMER PPP FEU-MCNC: 0.19 MG/L FEU (ref 0–0.65)
DIAGNOSIS, 93000: NORMAL
DIFFERENTIAL METHOD BLD: NORMAL
EOSINOPHIL # BLD: 0.2 K/UL (ref 0–0.4)
EOSINOPHIL NFR BLD: 2 % (ref 0–7)
EPITH CASTS URNS QL MICRO: NORMAL /LPF
ERYTHROCYTE [DISTWIDTH] IN BLOOD BY AUTOMATED COUNT: 13.7 % (ref 11.5–14.5)
GLOBULIN SER CALC-MCNC: 2.7 G/DL (ref 2–4)
GLUCOSE SERPL-MCNC: 100 MG/DL (ref 65–100)
GLUCOSE UR STRIP.AUTO-MCNC: NEGATIVE MG/DL
HCT VFR BLD AUTO: 37.3 % (ref 35–47)
HGB BLD-MCNC: 12.3 G/DL (ref 11.5–16)
HGB UR QL STRIP: NEGATIVE
HYALINE CASTS URNS QL MICRO: NORMAL /LPF (ref 0–5)
IMM GRANULOCYTES # BLD: 0 K/UL (ref 0–0.04)
IMM GRANULOCYTES NFR BLD AUTO: 0 % (ref 0–0.5)
KETONES UR QL STRIP.AUTO: NEGATIVE MG/DL
LEUKOCYTE ESTERASE UR QL STRIP.AUTO: NEGATIVE
LIPASE SERPL-CCNC: 111 U/L (ref 73–393)
LYMPHOCYTES # BLD: 2 K/UL (ref 0.8–3.5)
LYMPHOCYTES NFR BLD: 22 % (ref 12–49)
MAGNESIUM SERPL-MCNC: 2.3 MG/DL (ref 1.6–2.4)
MCH RBC QN AUTO: 30.8 PG (ref 26–34)
MCHC RBC AUTO-ENTMCNC: 33 G/DL (ref 30–36.5)
MCV RBC AUTO: 93.3 FL (ref 80–99)
MONOCYTES # BLD: 0.5 K/UL (ref 0–1)
MONOCYTES NFR BLD: 5 % (ref 5–13)
NEUTS SEG # BLD: 6.4 K/UL (ref 1.8–8)
NEUTS SEG NFR BLD: 70 % (ref 32–75)
NITRITE UR QL STRIP.AUTO: NEGATIVE
NRBC # BLD: 0 K/UL (ref 0–0.01)
NRBC BLD-RTO: 0 PER 100 WBC
P-R INTERVAL, ECG05: 156 MS
PH UR STRIP: 7.5 [PH] (ref 5–8)
PHOSPHATE SERPL-MCNC: 3.5 MG/DL (ref 2.6–4.7)
PLATELET # BLD AUTO: 161 K/UL (ref 150–400)
PMV BLD AUTO: 11.7 FL (ref 8.9–12.9)
POTASSIUM SERPL-SCNC: 3.5 MMOL/L (ref 3.5–5.1)
PROT SERPL-MCNC: 6.4 G/DL (ref 6.4–8.2)
PROT UR STRIP-MCNC: NEGATIVE MG/DL
Q-T INTERVAL, ECG07: 430 MS
QRS DURATION, ECG06: 86 MS
QTC CALCULATION (BEZET), ECG08: 467 MS
RBC # BLD AUTO: 4 M/UL (ref 3.8–5.2)
RBC #/AREA URNS HPF: NORMAL /HPF (ref 0–5)
SODIUM SERPL-SCNC: 144 MMOL/L (ref 136–145)
SP GR UR REFRACTOMETRY: 1.01 (ref 1–1.03)
TROPONIN I SERPL-MCNC: <0.04 NG/ML
TSH SERPL DL<=0.05 MIU/L-ACNC: 2.42 UIU/ML (ref 0.36–3.74)
UR CULT HOLD, URHOLD: NORMAL
UROBILINOGEN UR QL STRIP.AUTO: 0.2 EU/DL (ref 0.2–1)
VENTRICULAR RATE, ECG03: 71 BPM
WBC # BLD AUTO: 9.2 K/UL (ref 3.6–11)
WBC URNS QL MICRO: NORMAL /HPF (ref 0–4)

## 2018-05-06 PROCEDURE — 83735 ASSAY OF MAGNESIUM: CPT | Performed by: INTERNAL MEDICINE

## 2018-05-06 PROCEDURE — 74176 CT ABD & PELVIS W/O CONTRAST: CPT

## 2018-05-06 PROCEDURE — 82553 CREATINE MB FRACTION: CPT | Performed by: INTERNAL MEDICINE

## 2018-05-06 PROCEDURE — 85025 COMPLETE CBC W/AUTO DIFF WBC: CPT | Performed by: EMERGENCY MEDICINE

## 2018-05-06 PROCEDURE — 74011250637 HC RX REV CODE- 250/637: Performed by: INTERNAL MEDICINE

## 2018-05-06 PROCEDURE — 74011250637 HC RX REV CODE- 250/637: Performed by: SPECIALIST

## 2018-05-06 PROCEDURE — 96372 THER/PROPH/DIAG INJ SC/IM: CPT

## 2018-05-06 PROCEDURE — 84443 ASSAY THYROID STIM HORMONE: CPT | Performed by: INTERNAL MEDICINE

## 2018-05-06 PROCEDURE — 74011250636 HC RX REV CODE- 250/636: Performed by: INTERNAL MEDICINE

## 2018-05-06 PROCEDURE — 83690 ASSAY OF LIPASE: CPT | Performed by: INTERNAL MEDICINE

## 2018-05-06 PROCEDURE — 99218 HC RM OBSERVATION: CPT

## 2018-05-06 PROCEDURE — 84484 ASSAY OF TROPONIN QUANT: CPT | Performed by: EMERGENCY MEDICINE

## 2018-05-06 PROCEDURE — 81001 URINALYSIS AUTO W/SCOPE: CPT | Performed by: INTERNAL MEDICINE

## 2018-05-06 PROCEDURE — 93005 ELECTROCARDIOGRAM TRACING: CPT

## 2018-05-06 PROCEDURE — 80053 COMPREHEN METABOLIC PANEL: CPT | Performed by: EMERGENCY MEDICINE

## 2018-05-06 PROCEDURE — 84100 ASSAY OF PHOSPHORUS: CPT | Performed by: INTERNAL MEDICINE

## 2018-05-06 PROCEDURE — 85379 FIBRIN DEGRADATION QUANT: CPT | Performed by: INTERNAL MEDICINE

## 2018-05-06 PROCEDURE — 82150 ASSAY OF AMYLASE: CPT | Performed by: INTERNAL MEDICINE

## 2018-05-06 PROCEDURE — 36415 COLL VENOUS BLD VENIPUNCTURE: CPT | Performed by: INTERNAL MEDICINE

## 2018-05-06 PROCEDURE — 71045 X-RAY EXAM CHEST 1 VIEW: CPT

## 2018-05-06 RX ORDER — CARVEDILOL 6.25 MG/1
6.25 TABLET ORAL 2 TIMES DAILY WITH MEALS
Qty: 60 TAB | Refills: 0 | Status: SHIPPED | OUTPATIENT
Start: 2018-05-06 | End: 2018-05-18 | Stop reason: ALTCHOICE

## 2018-05-06 RX ORDER — BISACODYL 5 MG
5 TABLET, DELAYED RELEASE (ENTERIC COATED) ORAL DAILY PRN
Status: DISCONTINUED | OUTPATIENT
Start: 2018-05-06 | End: 2018-05-06 | Stop reason: HOSPADM

## 2018-05-06 RX ORDER — CLOBETASOL PROPIONATE 0.5 MG/G
CREAM TOPICAL 2 TIMES DAILY
Status: DISCONTINUED | OUTPATIENT
Start: 2018-05-06 | End: 2018-05-06 | Stop reason: SDUPTHER

## 2018-05-06 RX ORDER — ASPIRIN 81 MG/1
81 TABLET ORAL DAILY
Status: DISCONTINUED | OUTPATIENT
Start: 2018-05-06 | End: 2018-05-06 | Stop reason: HOSPADM

## 2018-05-06 RX ORDER — DOXYCYCLINE HYCLATE 100 MG
100 TABLET ORAL EVERY 12 HOURS
Status: DISCONTINUED | OUTPATIENT
Start: 2018-05-06 | End: 2018-05-06 | Stop reason: HOSPADM

## 2018-05-06 RX ORDER — SODIUM CHLORIDE 0.9 % (FLUSH) 0.9 %
5-10 SYRINGE (ML) INJECTION AS NEEDED
Status: DISCONTINUED | OUTPATIENT
Start: 2018-05-06 | End: 2018-05-06 | Stop reason: HOSPADM

## 2018-05-06 RX ORDER — PANTOPRAZOLE SODIUM 40 MG/1
40 TABLET, DELAYED RELEASE ORAL
Qty: 30 TAB | Refills: 1 | Status: SHIPPED | OUTPATIENT
Start: 2018-05-07 | End: 2018-05-06

## 2018-05-06 RX ORDER — HYDROCODONE BITARTRATE AND ACETAMINOPHEN 5; 325 MG/1; MG/1
1 TABLET ORAL
Status: DISCONTINUED | OUTPATIENT
Start: 2018-05-06 | End: 2018-05-06 | Stop reason: HOSPADM

## 2018-05-06 RX ORDER — ACETAMINOPHEN 325 MG/1
650 TABLET ORAL
Status: DISCONTINUED | OUTPATIENT
Start: 2018-05-06 | End: 2018-05-06 | Stop reason: HOSPADM

## 2018-05-06 RX ORDER — MORPHINE SULFATE 4 MG/ML
2 INJECTION, SOLUTION INTRAMUSCULAR; INTRAVENOUS
Status: DISCONTINUED | OUTPATIENT
Start: 2018-05-06 | End: 2018-05-06 | Stop reason: HOSPADM

## 2018-05-06 RX ORDER — AMLODIPINE BESYLATE 5 MG/1
10 TABLET ORAL DAILY
Status: DISCONTINUED | OUTPATIENT
Start: 2018-05-06 | End: 2018-05-06 | Stop reason: HOSPADM

## 2018-05-06 RX ORDER — ONDANSETRON 2 MG/ML
4 INJECTION INTRAMUSCULAR; INTRAVENOUS
Status: DISCONTINUED | OUTPATIENT
Start: 2018-05-06 | End: 2018-05-06 | Stop reason: HOSPADM

## 2018-05-06 RX ORDER — CARVEDILOL 6.25 MG/1
6.25 TABLET ORAL 2 TIMES DAILY WITH MEALS
Qty: 60 TAB | Refills: 0 | Status: SHIPPED | OUTPATIENT
Start: 2018-05-06 | End: 2018-05-06

## 2018-05-06 RX ORDER — HYDRALAZINE HYDROCHLORIDE 20 MG/ML
10 INJECTION INTRAMUSCULAR; INTRAVENOUS
Status: DISCONTINUED | OUTPATIENT
Start: 2018-05-06 | End: 2018-05-06 | Stop reason: HOSPADM

## 2018-05-06 RX ORDER — PANTOPRAZOLE SODIUM 40 MG/1
40 TABLET, DELAYED RELEASE ORAL
Status: DISCONTINUED | OUTPATIENT
Start: 2018-05-06 | End: 2018-05-06 | Stop reason: HOSPADM

## 2018-05-06 RX ORDER — CARVEDILOL 6.25 MG/1
6.25 TABLET ORAL 2 TIMES DAILY WITH MEALS
Status: DISCONTINUED | OUTPATIENT
Start: 2018-05-06 | End: 2018-05-06 | Stop reason: HOSPADM

## 2018-05-06 RX ORDER — PANTOPRAZOLE SODIUM 40 MG/1
40 TABLET, DELAYED RELEASE ORAL
Qty: 30 TAB | Refills: 1 | Status: SHIPPED | OUTPATIENT
Start: 2018-05-07 | End: 2018-05-18 | Stop reason: SDUPTHER

## 2018-05-06 RX ORDER — METOPROLOL TARTRATE 25 MG/1
25 TABLET, FILM COATED ORAL EVERY 12 HOURS
Status: DISCONTINUED | OUTPATIENT
Start: 2018-05-06 | End: 2018-05-06

## 2018-05-06 RX ORDER — ENOXAPARIN SODIUM 100 MG/ML
40 INJECTION SUBCUTANEOUS EVERY 24 HOURS
Status: DISCONTINUED | OUTPATIENT
Start: 2018-05-06 | End: 2018-05-06 | Stop reason: HOSPADM

## 2018-05-06 RX ORDER — MULTIVIT WITH MINERALS/HERBS
1 TABLET ORAL DAILY
Status: DISCONTINUED | OUTPATIENT
Start: 2018-05-06 | End: 2018-05-06 | Stop reason: HOSPADM

## 2018-05-06 RX ORDER — MELATONIN
1000 DAILY
Status: DISCONTINUED | OUTPATIENT
Start: 2018-05-06 | End: 2018-05-06 | Stop reason: HOSPADM

## 2018-05-06 RX ORDER — SODIUM CHLORIDE 0.9 % (FLUSH) 0.9 %
5-10 SYRINGE (ML) INJECTION EVERY 8 HOURS
Status: DISCONTINUED | OUTPATIENT
Start: 2018-05-06 | End: 2018-05-06 | Stop reason: HOSPADM

## 2018-05-06 RX ADMIN — Medication 10 ML: at 07:49

## 2018-05-06 RX ADMIN — PANTOPRAZOLE SODIUM 40 MG: 40 TABLET, DELAYED RELEASE ORAL at 10:44

## 2018-05-06 RX ADMIN — CARVEDILOL 6.25 MG: 6.25 TABLET, FILM COATED ORAL at 12:16

## 2018-05-06 RX ADMIN — ENOXAPARIN SODIUM 40 MG: 40 INJECTION SUBCUTANEOUS at 07:48

## 2018-05-06 RX ADMIN — VITAMIN D, TAB 1000IU (100/BT) 1000 UNITS: 25 TAB at 08:30

## 2018-05-06 RX ADMIN — ASPIRIN 81 MG: 81 TABLET, COATED ORAL at 10:44

## 2018-05-06 RX ADMIN — Medication 1 TABLET: at 08:30

## 2018-05-06 RX ADMIN — METOPROLOL TARTRATE 25 MG: 25 TABLET ORAL at 08:30

## 2018-05-06 RX ADMIN — DOXYCYCLINE HYCLATE 100 MG: 100 TABLET, COATED ORAL at 10:44

## 2018-05-06 NOTE — IP AVS SNAPSHOT
[address] [facility] [address] [place] [address] 
[phone] Patient: Sravani Hazel MRN: [id number] :1955 About your hospitalization You were admitted on:  May 6, 2018 You last received care in the:  [facility] 6S NEURO-SCI TELE You were discharged on:  May 6, 2018 Why you were hospitalized Your primary diagnosis was:  Chest Pain At Rest  
 Your diagnoses also included:  Gastroesophageal Reflux Disease Without Esophagitis, Essential Hypertension, Intermittent Palpitations Follow-up Information Follow up With Details Comments Contact Info Kristopher Friedman MD   [address] [place] [address] 
[phone] Your Scheduled Appointments Thursday May 10, 2018  3:45 PM EDT ROUTINE CARE with Kristopher Friedman MD  
[address] [facility] Internal Medicine [facility]) [address] [facility]  
[phone] Discharge Orders None A check mary indicates which time of day the medication should be taken. My Medications START taking these medications Instructions Each Dose to Equal  
 Morning Noon Evening Bedtime  
 carvedilol 6.25 mg tablet Commonly known as:  Tee Hirschfeld Your last dose was: Your next dose is: Take 1 Tab by mouth two (2) times daily (with meals). 6.25 mg  
    
   
   
   
  
 pantoprazole 40 mg tablet Commonly known as:  PROTONIX Start taking on:  2018 Your last dose was: Your next dose is: Take 1 Tab by mouth Daily (before breakfast). 40 mg CONTINUE taking these medications Instructions Each Dose to Equal  
 Morning Noon Evening Bedtime  
 aspirin delayed-release 81 mg tablet Your last dose was: Your next dose is: Take 81 mg by mouth daily. 81 mg  
    
   
   
   
  
 B COMPLEX 1 tablet Generic drug:  b complex vitamins Your last dose was: Your next dose is: Take 1 Tab by mouth daily. 1 Tab  
    
   
   
   
  
 clobetasol 0.05 % topical cream  
Commonly known as:  Sera Gabriel Your last dose was: Your next dose is:    
   
   
 APPLY TOPICALLY TO AFFECTED AREA TWICE DAILY  
     
   
   
   
  
 doxycycline 100 mg tablet Commonly known as:  VIBRA-TABS Your last dose was: Your next dose is: TAKE 1 TABLET TWICE A DAY  
     
   
   
   
  
 VITAMIN D3 1,000 unit tablet Generic drug:  cholecalciferol Your last dose was: Your next dose is: Take 1,000 Units by mouth daily. 1000 Units Where to Get Your Medications These medications were sent to [facility] [address] [place] [address], [address] [place] [address], [address] [place] [address] Phone:  [phone]  
  carvedilol 6.25 mg tablet  
 pantoprazole 40 mg tablet Discharge Instructions Discharge Instructions PATIENT ID: Feliz Loges MRN: [id number] YOB: 1955 DATE OF ADMISSION: 5/6/2018 12:03 AM   
DATE OF DISCHARGE: 5/6/2018 PRIMARY CARE PROVIDER: Bj Gates MD  
 
ATTENDING PHYSICIAN: Georgia Hawkins MD 
DISCHARGING PROVIDER: Georgia Hawkins MD   
To contact this individual call [phone] and ask the  to page. If unavailable ask to be transferred the Adult Hospitalist Department. DISCHARGE DIAGNOSES Chest pain Essential Hypertension Palpitations Hyperlipidemia Macroscopic Hematuria Chest Pain: Care Instructions Your Care Instructions There are many things that can cause chest pain. Some are not serious and will get better on their own in a few days. But some kinds of chest pain need more testing and treatment.  Your doctor may have recommended a follow-up visit in the next 8 to 12 hours. If you are not getting better, you may need more tests or treatment. Even though your doctor has released you, you still need to watch for any problems. The doctor carefully checked you, but sometimes problems can develop later. If you have new symptoms or if your symptoms do not get better, get medical care right away. If you have worse or different chest pain or pressure that lasts more than 5 minutes or you passed out (lost consciousness), call 911 or seek other emergency help right away. A medical visit is only one step in your treatment. Even if you feel better, you still need to do what your doctor recommends, such as going to all suggested follow-up appointments and taking medicines exactly as directed. This will help you recover and help prevent future problems. How can you care for yourself at home? · Rest until you feel better. · Take your medicine exactly as prescribed. Call your doctor if you think you are having a problem with your medicine. · Do not drive after taking a prescription pain medicine. When should you call for help? Call 911 if: 
? · You passed out (lost consciousness). ? · You have severe difficulty breathing. ? · You have symptoms of a heart attack. These may include: ¨ Chest pain or pressure, or a strange feeling in your chest. 
¨ Sweating. ¨ Shortness of breath. ¨ Nausea or vomiting. ¨ Pain, pressure, or a strange feeling in your back, neck, jaw, or upper belly or in one or both shoulders or arms. ¨ Lightheadedness or sudden weakness. ¨ A fast or irregular heartbeat. After you call 911, the  may tell you to chew 1 adult-strength or 2 to 4 low-dose aspirin. Wait for an ambulance. Do not try to drive yourself. ?Call your doctor today if: 
? · You have any trouble breathing. ? · Your chest pain gets worse. ? · You are dizzy or lightheaded, or you feel like you may faint. ? · You are not getting better as expected. ? · You are having new or different chest pain. Where can you learn more? Go to http://milton-vahid.info/. Enter A120 in the search box to learn more about \"Chest Pain: Care Instructions. \" Current as of: March 20, 2017 Content Version: 11.4 © 3340-7375 [employer]. Care instructions adapted under license by [employer] (which disclaims liability or warranty for this information). If you have questions about a medical condition or this instruction, always ask your healthcare professional. [place] [address] any warranty or liability for your use of this information. CONSULTATIONS: IP CONSULT TO CARDIOLOGY PROCEDURES/SURGERIES: * No surgery found * PENDING TEST RESULTS:  
At the time of discharge the following test results are still pending: None FOLLOW UP APPOINTMENTS:  
Follow-up Information Follow up With Details Comments Contact Info Ji Moreira MD   [address] [place] [address] [address] [place] [address] 
[phone] ADDITIONAL CARE RECOMMENDATIONS: None DIET: Cardiac Diet Oral Nutritional Supplements: No Oral Supplement prescribed ACTIVITY: Activity as tolerated WOUND CARE:  None EQUIPMENT needed: None DISCHARGE MEDICATIONS: 
 See Medication Reconciliation Form · It is important that you take the medication exactly as they are prescribed. · Keep your medication in the bottles provided by the pharmacist and keep a list of the medication names, dosages, and times to be taken in your wallet. · Do not take other medications without consulting your doctor. NOTIFY YOUR PHYSICIAN FOR ANY OF THE FOLLOWING:  
Fever over 101 degrees for 24 hours. Chest pain, shortness of breath, fever, chills, nausea, vomiting, diarrhea, change in mentation, falling, weakness, bleeding. Severe pain or pain not relieved by medications. Or, any other signs or symptoms that you may have questions about. DISPOSITION: 
  Home With: 
 OT  PT  HH  RN  
  
 SNF/Inpatient Rehab/LTAC  
X Independent/assisted living Hospice Other: CDMP Checked:  
Yes x PROBLEM LIST Updated: 
Yes x Signed:  
Wolfgang Roth MD 
5/6/2018 12:33 PM 
 
  
  
  
Introducing [facility]! Dear Ramakrishna General: Thank you for requesting a [employer] account. Our records indicate that you already have an active [employer] account. You can access your account anytime at [email or web address] Did you know that you can access your hospital and ER discharge instructions at any time in [employer]? You can also review all of your test results from your hospital stay or ER visit. Additional Information If you have questions, please visit the Frequently Asked Questions section of the [employer] website at [email or web address] Remember, [employer] is NOT to be used for urgent needs. For medical emergencies, dial 911. Now available from your iPhone and Android! Introducing [employer]oo As a [facility] patient, I wanted to make you aware of our electronic visit tool called [employer]. [facility] 24/7 allows you to connect within minutes with a medical provider 24 hours a day, seven days a week via a mobile device or tablet or logging into a secure website from your computer. You can access Cleve Boo from anywhere in the [place]. A virtual visit might be right for you when you have a simple condition and feel like you just dont want to get out of bed, or cant get away from work for an appointment, when your regular [facility] provider is not available (evenings, weekends or holidays), or when youre out of town and need minor care.   Electronic visits cost only $49 and if the Cleve Boo provider determines a prescription is needed to treat your condition, one can be electronically transmitted to a nearby pharmacy*. Please take a moment to enroll today if you have not already done so. The enrollment process is free and takes just a few minutes. To enroll, please download the [facility] 24/7 juan to your tablet or phone, or visit [email or web address] to enroll on your computer. And, as an [address] [place] [address] patient with a [employer] account, the results of your visits will be scanned into your electronic medical record and your primary care provider will be able to view the scanned results. We urge you to continue to see your regular [employer] provider for your ongoing medical care. And while your primary care provider may not be the one available when you seek a [employer] virtual visit, the peace of mind you get from getting a real diagnosis real time can be priceless. For more information on [facility] [employer], view our Frequently Asked Questions (FAQs) at [email or web address]. Sincerely, 
 
Jennifer Alexandre MD 
Chief Medical Officer [employer] *:  certain medications cannot be prescribed via [employer] Providers Seen During Your Hospitalization Provider Specialty Primary office phone Lorri Morales MD Emergency Medicine [phone] Nikki Pompa MD Internal Medicine [phone] Jerrilyn Soulier, MD Internal Medicine [phone] Your Primary Care Physician (PCP) Primary Care Physician Office Phone Office Fax [address], [place] [phone] You are allergic to the following Allergen Reactions Morphine Nausea Only Zithromax (Azithromycin) Nausea Only Recent Documentation Height Weight Breastfeeding? BMI OB Status Smoking Status 1.626 m 65.5 kg No 24.79 kg/m2 Postmenopausal Never Smoker Emergency Contacts Name Discharge Info Relation Home Work Mobile Augusta Fernandes CAREGIVER [3] Spouse [3] [phone] Patient Belongings The following personal items are in your possession at time of discharge: 
  Dental Appliances: None  Visual Aid: None      Home Medications: None   Jewelry: None  Clothing: At bedside    Other Valuables: None  Personal Items Sent to Safe: None Please provide this summary of care documentation to your next provider. Signatures-by signing, you are acknowledging that this After Visit Summary has been reviewed with you and you have received a copy. Patient Signature:  ____________________________________________________________ Date:  ____________________________________________________________  
  
[facility] Provider Signature:  ____________________________________________________________ Date:  ____________________________________________________________

## 2018-05-06 NOTE — PROGRESS NOTES
Problem: Hypertension  Goal: *Blood pressure within specified parameters  Outcome: Progressing Towards Goal  BP within specified parameters. Consult for cardiology called.

## 2018-05-06 NOTE — PROGRESS NOTES
Hospitalist Progress Note  Ruthann Sanz MD  Answering service: [phone] OR [phone] from in house phone        Date of Service:  2018  NAME:  Edmund SWENSON:  1955  MRN:  [id number]      Admission Summary:   The patient is 58year old  female who present with a history of atypical chest discomfort and macroscopic hematuria. The patient reports that the pain is intermittent and denies radiation or associated symptoms. She also denies shortness of breath or RODGERS. Previously evaluated by Dr. Oscar Quintana from cardiology approximately 3 years PTA. Work-up, including an EST was negative. Evaluated in the ED and returned within 24 hours because of BP abnormalities. Denies a prior history of essential hypertension. Interval history / Subjective:    Alert and oriented without complaints. Denies any chest discomfort. However, she reports that her BP has been noted to be approximately 140/90 mmhg by her PCP in his office. She claims that he has chosen not to medicate her. She also reports a h/o palpitations, prior to admission. Denies a history of atrial or ventricular arrhythmias. No diagnosis of thyroid disease. Positive h/o GERD. Assessment & Plan:     1. Chest pain - appears atypical based on the history an physical examination. In fact, the patient does not even identify it as chest pain, only a mild discomfort following meals in a patient with known GERD. 12 lead EKG's - negative; Cardiac markers - negative. Prior work-up approximately 3 years PTA. Cardiology consultation ordered and once cleared, the patient can have an outpatient EST, if required. However, she also reports a h/o palpitations. Nothing noted since admission. TSH is pending. Can also have a reveal  Monitoring device placed as an outpatient if cardiology believes she may be symptomatic and undocumented arrhthymias.       2.  Stage 3 HTN- BP's of 140/90 mmhg currently recommendations are for therapy. As it appears that the patient has atypical chest pain and did not really respond to the beta blocker, have discontinued the metoprolol and starting the patient on a course of amlodipine - 10mg/daily for BP control. 3.  Macroscopic Hematuria - CT scan of the abdomen and pelvis with a non obstructing renal calculi and no hydronephrosis. H and H is stable  Outpatient evaluation with urology scheduled for Monday - 5/7/18. Discharge planning once cardiology consultation is complete. Code status: FULL CODE  DVT prophylaxis: lovenox    Care Plan discussed with: Patient/Family  Disposition: Home w/Family and TBD     Hospital Problems  Date Reviewed: 5/6/2018          Codes Class Noted POA    * (Principal)Chest pain ICD-10-CM: R07.9  ICD-9-CM: 786.50  5/6/2018 Yes                Review of Systems:   A comprehensive review of systems was negative except for that written in the HPI. Vital Signs:    Last 24hrs VS reviewed since prior progress note. Most recent are:  Visit Vitals    BP (!) 146/92    Pulse 77    Temp 98.3 °F (36.8 °C)    Resp 14    Ht 5' 4\" (1.626 m)    Wt 65.5 kg (144 lb 6.4 oz)    SpO2 96%    Breastfeeding No    BMI 24.79 kg/m2       No intake or output data in the 24 hours ending 05/06/18 1008     Physical Examination:             Constitutional:  No acute distress, cooperative, pleasant    ENT:  Oral mucous moist, oropharynx benign. Neck supple,    Resp:  CTA bilaterally. No wheezing/rhonchi/rales. No accessory muscle use   CV:  Regular rhythm, normal rate, no murmurs, gallops, rubs    GI:  Soft, non distended, non tender. normoactive bowel sounds, no hepatosplenomegaly     Musculoskeletal:  No edema, warm, 2+ pulses throughout    Neurologic:  Moves all extremities.   AAOx3, CN II-XII reviewed            Data Review:    Review and/or order of clinical lab test      Labs:     Recent Labs      05/06/18   0137  05/04/18 1413   WBC  9.2  8.2   HGB  12.3  14.0   HCT  37.3  42.5   PLT  161  202     Recent Labs      05/06/18   0738 05/06/18 0137 05/04/18   1413   NA   --   144  143   K   --   3.5  3.3*   CL   --   108  107   CO2   --   29  29   BUN   --   20  17   CREA   --   0.80  0.68   GLU   --   100  100   CA   --   8.7  9.3   MG  2.3   --    --    PHOS  3.5   --    --      Recent Labs      05/06/18 0738 05/06/18 0137 05/04/18   1413   SGOT   --   12*  15   ALT   --   19  21   AP   --   79  89   TBILI   --   0.8  1.2*   TP   --   6.4  7.2   ALB   --   3.7  4.0   GLOB   --   2.7  3.2   AML  40   --    --    LPSE  111   --   101     No results for input(s): INR, PTP, APTT in the last 72 hours. No lab exists for component: INREXT   No results for input(s): FE, TIBC, PSAT, FERR in the last 72 hours. Lab Results   Component Value Date/Time    Folate 9.5 06/02/2015 09:37 AM      No results for input(s): PH, PCO2, PO2 in the last 72 hours.   Recent Labs      05/06/18 0738 05/06/18 0137 05/04/18   1413   CPK  78   --    --    CKNDX  Cannot be calculated   --    --    TROIQ   --   <0.04  <0.04     Lab Results   Component Value Date/Time    Cholesterol, total 169 12/05/2017 12:00 AM    HDL Cholesterol 46 12/05/2017 12:00 AM    LDL, calculated 107 (H) 12/05/2017 12:00 AM    Triglyceride 80 12/05/2017 12:00 AM    CHOL/HDL Ratio 3.9 12/18/2009 07:45 AM     No results found for: [facility]  Lab Results   Component Value Date/Time    Color YELLOW/STRAW 05/06/2018 07:38 AM    Appearance CLEAR 05/06/2018 07:38 AM    Specific gravity 1.009 05/06/2018 07:38 AM    pH (UA) 7.5 05/06/2018 07:38 AM    Protein NEGATIVE  05/06/2018 07:38 AM    Glucose NEGATIVE  05/06/2018 07:38 AM    Ketone NEGATIVE  05/06/2018 07:38 AM    Bilirubin NEGATIVE  05/06/2018 07:38 AM    Urobilinogen 0.2 05/06/2018 07:38 AM    Nitrites NEGATIVE  05/06/2018 07:38 AM    Leukocyte Esterase NEGATIVE  05/06/2018 07:38 AM    Epithelial cells FEW 05/06/2018 07:38 AM Bacteria NEGATIVE  05/06/2018 07:38 AM    WBC 0-4 05/06/2018 07:38 AM    RBC 0-5 05/06/2018 07:38 AM         Medications Reviewed:     Current Facility-Administered Medications   Medication Dose Route Frequency    acetaminophen (TYLENOL) tablet 650 mg  650 mg Oral Q4H PRN    aspirin delayed-release tablet 81 mg  81 mg Oral DAILY    vitamin B complex tablet  1 Tab Oral DAILY    cholecalciferol (VITAMIN D3) tablet 1,000 Units  1,000 Units Oral DAILY    doxycycline (VIBRA-TABS) tablet 100 mg  100 mg Oral Q12H    sodium chloride (NS) flush 5-10 mL  5-10 mL IntraVENous Q8H    sodium chloride (NS) flush 5-10 mL  5-10 mL IntraVENous PRN    HYDROcodone-acetaminophen (NORCO) 5-325 mg per tablet 1 Tab  1 Tab Oral Q4H PRN    morphine injection 2 mg  2 mg IntraVENous Q4H PRN    ondansetron (ZOFRAN) injection 4 mg  4 mg IntraVENous Q4H PRN    bisacodyl (DULCOLAX) tablet 5 mg  5 mg Oral DAILY PRN    enoxaparin (LOVENOX) injection 40 mg  40 mg SubCUTAneous Q24H    hydrALAZINE (APRESOLINE) 20 mg/mL injection 10 mg  10 mg IntraVENous Q6H PRN    amLODIPine (NORVASC) tablet 10 mg  10 mg Oral DAILY    pantoprazole (PROTONIX) tablet 40 mg  40 mg Oral ACB     ______________________________________________________________________  EXPECTED LENGTH OF STAY: - - -  ACTUAL LENGTH OF STAY:          0                 Ha Sams MD

## 2018-05-06 NOTE — ED PROVIDER NOTES
HPI       58year old female with GERD, rosacea, presents to ED for second day with concerns for elevated BP, intermittent chest tightness and left flank pain. States this past Monday was driving back from [address], [address] and noted pink urine. No pain. She does have history of kidney stones 18 months ago and has been having some left flank pain. No pain with urine. Saw her doctor, had blood in urine and referred to Dr. Scarlet Miller. Started reading on internet about blood in urine and became concerned about kidney disease so started check her blood pressure and noted it was high> She admits this is making her anxious. She kept checking it and it was still high 190/100 so she came in yesterday. Checked out ok with labs/urine/cxr/ekg was given 1 dose of clonidine for her elevated BP and sent home to follow up with pcp and cardiology. She states the chest tightness if brief, no associated SOB, slight nasuea. Mowed the lawn this week with no pain or sob or fatigue. Did yard work at [employer] week wtihout symptoms. Chest tightness comes and goes without rhyme reason. None today. Tonight left a little nasuea, didn't feel well, checked her bp and it was high again so they came back. Currently feels ok. Has had some mild morning  headaches, no vision change, no hcange in speack no focal weakness or numbness. Cardiac risk factors include age, family history (mother and mothers siblings) ?  HTN   Past Medical History:   Diagnosis Date    Fracture 2016    Right foot fx (pt turned foot while stepping off curb)    GERD (gastroesophageal reflux disease)     HX    Headache(784.0) 12/17/2009    Mixed hyperlipidemia 12/17/2009    Rosacea 2017       Past Surgical History:   Procedure Laterality Date    HX BLADDER SUSPENSION  5/2014    HX DILATION AND CURETTAGE      HX TUBAL LIGATION      TITA US BX BREAST LT 1ST LESION W/CLIP AND SPECIMEN Left 12/09/2016    benign         Family History:   Problem Relation Age of Onset    Heart Disease Mother      MI    Cancer Father      Unknown primary    Anesth Problems Neg Hx        Social History     Social History    Marital status:      Spouse name: N/A    Number of children: N/A    Years of education: N/A     Occupational History    Not on file. Social History Main Topics    Smoking status: Never Smoker    Smokeless tobacco: Never Used    Alcohol use 1.0 oz/week     2 Glasses of wine per week      Comment: WEEKLY    Drug use: No    Sexual activity: Yes     Partners: Male     Other Topics Concern    Not on file     Social History Narrative         ALLERGIES: Morphine and Zithromax [azithromycin]    Review of Systems   Constitutional: Negative for chills and fever. Eyes: Negative for visual disturbance. Respiratory: Positive for chest tightness. Negative for cough and shortness of breath. Cardiovascular: Positive for chest pain. Negative for palpitations and leg swelling. Gastrointestinal: Positive for nausea. Negative for abdominal pain and vomiting. Endocrine: Negative for polyuria. Genitourinary: Positive for flank pain. Negative for dysuria. Musculoskeletal: Negative for gait problem. Skin: Negative for rash. Neurological: Positive for headaches. Negative for speech difficulty, weakness and numbness. Psychiatric/Behavioral: Negative for dysphoric mood. Vitals:    05/06/18 0013 05/06/18 0033 05/06/18 0049   BP: (!) 167/100 157/82 139/90   Pulse: 73 74 77   Resp: 18  18   Temp: 98.9 °F (37.2 °C)     SpO2: 100% 99% 99%   Weight: 65.5 kg (144 lb 6.4 oz)     Height: 5' 4\" (1.626 m)              Physical Exam   Constitutional: She is oriented to person, place, and time. She appears well-developed and well-nourished. No distress. HENT:   Head: Normocephalic and atraumatic. Mouth/Throat: Oropharynx is clear and moist. No oropharyngeal exudate. Eyes: Conjunctivae and EOM are normal. Pupils are equal, round, and reactive to light.  Right eye exhibits no discharge. Left eye exhibits no discharge. No scleral icterus. Neck: Normal range of motion. Neck supple. No JVD present. Cardiovascular: Normal rate, regular rhythm, normal heart sounds and intact distal pulses. Exam reveals no gallop and no friction rub. No murmur heard. Pulmonary/Chest: Effort normal and breath sounds normal. No stridor. No respiratory distress. She has no wheezes. She has no rales. She exhibits no tenderness. Abdominal: Soft. Bowel sounds are normal. She exhibits no distension and no mass. There is no tenderness. There is no rebound and no guarding. Musculoskeletal: Normal range of motion. She exhibits no edema or tenderness. Neurological: She is alert and oriented to person, place, and time. She has normal reflexes. No cranial nerve deficit. She exhibits normal muscle tone. Coordination normal.   Skin: Skin is warm and dry. No rash noted. No erythema. Psychiatric: She has a normal mood and affect. Her behavior is normal. Judgment and thought content normal.        [facility]      ED Course       Procedures      ED EKG interpretation:  Rhythm: normal sinus rhythm; and regular . Rate (approx.): 71; Axis: normal; P wave: normal; QRS interval: normal ; ST/T wave: non-specific changes; New inverted T waves in V2 compared to EKG 5/4/2018. . This EKG was interpreted by Jesus Manuel Damon MD,ED Provider. BP coming down nicely without intervention. CT neg acute process. Labs ok. Seocond visit for chest tightness symptoms with hypertension. T wave inversion in V2 which is new. Will recommend admission for further work up of chest pain symptoms.

## 2018-05-06 NOTE — DISCHARGE SUMMARY
Discharge Summary       PATIENT ID: Luis Rios  MRN: [id number]   YOB: 1955    DATE OF ADMISSION: 5/6/2018 12:03 AM    DATE OF DISCHARGE: 5/6/2018   PRIMARY CARE PROVIDER: Joanie Crandall MD     ATTENDING PHYSICIAN:   DISCHARGING PROVIDER: Chapo Yancey MD    To contact this individual call [phone] and ask the  to page. If unavailable ask to be transferred the Adult Hospitalist Department. CONSULTATIONS: IP CONSULT TO CARDIOLOGY    PROCEDURES/SURGERIES: * No surgery found *    [address] COURSE:   This is a 59-year-old woman with a past medical history significant for gastroesophageal reflux disease, dyslipidemia, rosacea on doxycycline was in her usual state of health until a couple of days ago when patient noticed that she was passing pink urine. Patient developed these symptoms while she was driving home from her trip. She has a history of kidney stones. Patient also explained some left flank pain. She went to her primary care doctor. The patient was told that she has blood in her urine and was referred to the urologist.  The appointment with the urologist is this coming Monday. Because of the blood in the urine, patient went on the internet and started reading about the significance of blood in urine and came across the fact that blood in the urine can affect kidney disease and kidney disease can lead to high blood pressure. Patient checked her blood pressure, it was elevated. Patient also developed chest discomfort, which the patient described as a chest tightness, which is intermittent, 2/10 in severity. No associated shortness of breath. No known relieving factors. The patient also stated that she is more anxious because of what she was reading on the internet. Came to the emergency room first and was found to have elevated blood pressure.   Patient received a dose of clonidine, was discharged home and advised to follow up with her primary care physician and cardiologist.  The patient came back to the emergency room for the second time because of the elevated blood pressure and intermittent chest tightness. No associated nausea, no vomiting. Patient at this time was referred to the hospitalist service for placement under observation. The patient was admitted to [facility]. She ruled out as an acute coronary syndrome, but was found to have elevated BP  A cardiology consultation was obtained and the patient was cleared from a cardiac standpoint. Coreg was initiated for BP control and Protonix for her GERD. She is also scheduled to follow with Urology on  Monday 5/7/18 for the macroscopic hematuria. CT scan of the abdomen and pelvis with negative for hydronephrosis. + renal calculus - nonobstructing. She is being discharged home and is scheduled to follow with her PCP and cardiology within 1 week. DISCHARGE DIAGNOSES / PLAN:      1. Chest pain - atypical   2. Accelerated stage 3 Hypertension- started on coreg  3. Macroscopic Hematuria - Negative CT of the abdomen and pelvis with stone protocol. Scheduled to follow with Urology as an outpatient. 4. Hyperlipidemia       PENDING TEST RESULTS:   At the time of discharge the following test results are still pending: None    FOLLOW UP APPOINTMENTS:    Follow-up Information     Follow up With Details Comments 5 Yajaira Crooks III, MD   [address] [place] [address]  [phone]             ADDITIONAL CARE RECOMMENDATIONS: None    DIET: Cardiac Diet  Oral Nutritional Supplements: No Oral Supplement prescribed    ACTIVITY: Activity as tolerated    WOUND CARE: None    EQUIPMENT needed: None      DISCHARGE MEDICATIONS:  Current Discharge Medication List      START taking these medications    Details   carvedilol (COREG) 6.25 mg tablet Take 1 Tab by mouth two (2) times daily (with meals).   Qty: 60 Tab, Refills: 0      pantoprazole (PROTONIX) 40 mg tablet Take 1 Tab by mouth Daily (before breakfast). Qty: 30 Tab, Refills: 1         CONTINUE these medications which have NOT CHANGED    Details   clobetasol (TEMOVATE) 0.05 % topical cream APPLY TOPICALLY TO AFFECTED AREA TWICE DAILY  Refills: 6      doxycycline (VIBRA-TABS) 100 mg tablet TAKE 1 TABLET TWICE A DAY  Refills: 5      b complex vitamins (B COMPLEX 1) tablet Take 1 Tab by mouth daily. cholecalciferol (VITAMIN D3) 1,000 unit tablet Take 1,000 Units by mouth daily. aspirin delayed-release 81 mg tablet Take 81 mg by mouth daily. NOTIFY YOUR PHYSICIAN FOR ANY OF THE FOLLOWING:   Fever over 101 degrees for 24 hours. Chest pain, shortness of breath, fever, chills, nausea, vomiting, diarrhea, change in mentation, falling, weakness, bleeding. Severe pain or pain not relieved by medications. Or, any other signs or symptoms that you may have questions about.     DISPOSITION:    Home With:   OT  PT  HH  RN       Long term SNF/Inpatient Rehab   X Independent/assisted living    Hospice    Other:       PATIENT CONDITION AT DISCHARGE:     Functional status    Poor     Deconditioned    X Independent      Cognition   X  Lucid     Forgetful     Dementia      Catheters/lines (plus indication)    Jaime     PICC     PEG    X None      Code status     Full code     DNR      PHYSICAL EXAMINATION AT DISCHARGE:   Refer to Progress Note- 5/6/18      CHRONIC MEDICAL DIAGNOSES:  Problem List as of 5/6/2018  Date Reviewed: 5/6/2018          Codes Class Noted - Resolved    * (Principal)Chest pain at rest ICD-10-CM: R07.9  ICD-9-CM: 786.50  5/6/2018 - Present        Gastroesophageal reflux disease without esophagitis ICD-10-CM: K21.9  ICD-9-CM: 530.81  5/6/2018 - Present        Essential hypertension ICD-10-CM: I10  ICD-9-CM: 401.9  5/6/2018 - Present        Intermittent palpitations ICD-10-CM: R00.2  ICD-9-CM: 785.1  5/6/2018 - Present        Pelvic relaxation ICD-10-CM: N81.89  ICD-9-CM: 618.89  5/30/2014 - Present Mixed hyperlipidemia ICD-10-CM: E78.2  ICD-9-CM: 272.2  12/17/2009 - Present        Headache(784.0) ICD-10-CM: R51  ICD-9-CM: 784.0  12/17/2009 - Present              Greater than 40 minutes were spent with the patient on counseling and coordination of care    Signed:   Khalida Tripp MD  5/6/2018  12:38 PM

## 2018-05-06 NOTE — CONSULTS
Consult Note      Assessment:    Patient Active Problem List   Diagnosis Code    Mixed hyperlipidemia E78.2    Headache(784.0) R51    Pelvic relaxation N81.89    Chest pain at rest R07.9    Gastroesophageal reflux disease without esophagitis K21.9    Essential hypertension I10    Intermittent palpitations R00.2       Recommendations:    Start Coreg for BP and palpitations, consider PPI, OK to release can follow with Dr. Peter Tompkins MD  [phone]  Leandro Mercer is a 58 y.o. female who presented 5/6/2018 with complaints of chest pain, irregular heart rate, elevated BP and hematuria. The symptoms began approximately 3 days ago. She has no history of cardiac disease including CAD and Atrial Fib, had normal stress echo several years ago. She has occasional chest tightness like indigestion, can mow yard without difficulty. Renal calculus noted on CT. She says BP is going high and low, she and her family members are quite concerned about this. Examination by the attending physician suggested the possibility of CAD and hypertension. At present the patient has moderately improved since initial presentation. Therapy thus far has included O2. Cardiology has been consulted to assist in the management of this patient.     Current Facility-Administered Medications   Medication Dose Route Frequency    acetaminophen (TYLENOL) tablet 650 mg  650 mg Oral Q4H PRN    aspirin delayed-release tablet 81 mg  81 mg Oral DAILY    vitamin B complex tablet  1 Tab Oral DAILY    cholecalciferol (VITAMIN D3) tablet 1,000 Units  1,000 Units Oral DAILY    doxycycline (VIBRA-TABS) tablet 100 mg  100 mg Oral Q12H    sodium chloride (NS) flush 5-10 mL  5-10 mL IntraVENous Q8H    sodium chloride (NS) flush 5-10 mL  5-10 mL IntraVENous PRN    HYDROcodone-acetaminophen (NORCO) 5-325 mg per tablet 1 Tab  1 Tab Oral Q4H PRN    morphine injection 2 mg  2 mg IntraVENous Q4H PRN    ondansetron (ZOFRAN) injection 4 mg  4 mg IntraVENous Q4H PRN    bisacodyl (DULCOLAX) tablet 5 mg  5 mg Oral DAILY PRN    enoxaparin (LOVENOX) injection 40 mg  40 mg SubCUTAneous Q24H    hydrALAZINE (APRESOLINE) 20 mg/mL injection 10 mg  10 mg IntraVENous Q6H PRN    amLODIPine (NORVASC) tablet 10 mg  10 mg Oral DAILY    pantoprazole (PROTONIX) tablet 40 mg  40 mg Oral ACB    carvedilol (COREG) tablet 6.25 mg  6.25 mg Oral BID WITH MEALS     Past Medical History:   Diagnosis Date    Fracture 2016    Right foot fx (pt turned foot while stepping off curb)    GERD (gastroesophageal reflux disease)     HX    Headache(784.0) 12/17/2009    Mixed hyperlipidemia 12/17/2009    Rosacea 2017     Patient Active Problem List   Diagnosis Code    Mixed hyperlipidemia E78.2    Headache(784.0) R51    Pelvic relaxation N81.89    Chest pain at rest R07.9    Gastroesophageal reflux disease without esophagitis K21.9    Essential hypertension I10    Intermittent palpitations R00.2     Allergies   Allergen Reactions    Morphine Nausea Only    Zithromax [Azithromycin] Nausea Only     Social History   Substance Use Topics    Smoking status: Never Smoker    Smokeless tobacco: Never Used    Alcohol use 1.0 oz/week     2 Glasses of wine per week      Comment: WEEKLY     Family History   Problem Relation Age of Onset    Heart Disease Mother      MI    Cancer Father      Unknown primary    Anesth Problems Neg Hx        Review of Symptoms:  A comprehensive review of systems was negative except for that written in the HPI.      Objective:      Visit Vitals    BP (!) 146/92    Pulse 77    Temp 98.3 °F (36.8 °C)    Resp 14    Ht 5' 4\" (1.626 m)    Wt 65.5 kg (144 lb 6.4 oz)    SpO2 96%    Breastfeeding No    BMI 24.79 kg/m2      Physical Exam    Visit Vitals    BP (!) 146/94 (BP 1 Location: Right arm, BP Patient Position: At rest)    Pulse 68    Temp 98.4 °F (36.9 °C)    Resp 14    Ht 5' 4\" (1.626 m)    Wt 65.5 kg (144 lb 6.4 oz)  SpO2 96%    Breastfeeding No    BMI 24.79 kg/m2     General Appearance:  Well developed, well nourished,alert and oriented x 3,  individual in no acute distress. Ears/Nose/Mouth/Throat:   Hearing grossly normal.         Neck: Supple. Chest:   Lungs clear to auscultation bilaterally. Cardiovascular:  Regular rate and rhythm, S1, S2 normal, no murmur. Abdomen:   Soft, non-tender, bowel sounds are active. Extremities: No edema bilaterally. Skin: Warm and dry. Cardiographics    Telemetry: normal sinus rhythm  ECG: normal EKG, normal sinus rhythm, unchanged from previous tracings  Echocardiogram: Not done    Labs:   Recent Results (from the past 24 hour(s))   EKG, 12 LEAD, INITIAL    Collection Time: 05/06/18  1:29 AM   Result Value Ref Range    Ventricular Rate 71 BPM    Atrial Rate 71 BPM    P-R Interval 156 ms    QRS Duration 86 ms    Q-T Interval 430 ms    QTC Calculation (Bezet) 467 ms    Calculated P Axis 32 degrees    Calculated R Axis 22 degrees    Calculated T Axis 34 degrees    Diagnosis       Normal sinus rhythm  When compared with ECG of 04-MAY-2018 14:08,  No significant change was found  Confirmed by Herbie Arrington MD, Dannaon ([id number]) on 5/6/2018 9:25:27 AM     TROPONIN I    Collection Time: 05/06/18  1:37 AM   Result Value Ref Range    Troponin-I, Qt. <0.04 <0.05 ng/mL   CBC WITH AUTOMATED DIFF    Collection Time: 05/06/18  1:37 AM   Result Value Ref Range    WBC 9.2 3.6 - 11.0 K/uL    RBC 4.00 3.80 - 5.20 M/uL    HGB 12.3 11.5 - 16.0 g/dL    HCT 37.3 35.0 - 47.0 %    MCV 93.3 80.0 - 99.0 FL    MCH 30.8 26.0 - 34.0 PG    MCHC 33.0 30.0 - 36.5 g/dL    RDW 13.7 11.5 - 14.5 %    PLATELET 704 317 - 054 K/uL    MPV 11.7 8.9 - 12.9 FL    NRBC 0.0 0  WBC    ABSOLUTE NRBC 0.00 0.00 - 0.01 K/uL    NEUTROPHILS 70 32 - 75 %    LYMPHOCYTES 22 12 - 49 %    MONOCYTES 5 5 - 13 %    EOSINOPHILS 2 0 - 7 %    BASOPHILS 1 0 - 1 %    IMMATURE GRANULOCYTES 0 0.0 - 0.5 %    ABS.  NEUTROPHILS 6.4 1.8 - 8.0 K/UL    ABS. LYMPHOCYTES 2.0 0.8 - 3.5 K/UL    ABS. MONOCYTES 0.5 0.0 - 1.0 K/UL    ABS. EOSINOPHILS 0.2 0.0 - 0.4 K/UL    ABS. BASOPHILS 0.1 0.0 - 0.1 K/UL    ABS. IMM. GRANS. 0.0 0.00 - 0.04 K/UL    DF AUTOMATED     METABOLIC PANEL, COMPREHENSIVE    Collection Time: 05/06/18  1:37 AM   Result Value Ref Range    Sodium 144 136 - 145 mmol/L    Potassium 3.5 3.5 - 5.1 mmol/L    Chloride 108 97 - 108 mmol/L    CO2 29 21 - 32 mmol/L    Anion gap 7 5 - 15 mmol/L    Glucose 100 65 - 100 mg/dL    BUN 20 6 - 20 MG/DL    Creatinine 0.80 0.55 - 1.02 MG/DL    BUN/Creatinine ratio 25 (H) 12 - 20      GFR est AA >60 >60 ml/min/1.73m2    GFR est non-AA >60 >60 ml/min/1.73m2    Calcium 8.7 8.5 - 10.1 MG/DL    Bilirubin, total 0.8 0.2 - 1.0 MG/DL    ALT (SGPT) 19 12 - 78 U/L    AST (SGOT) 12 (L) 15 - 37 U/L    Alk.  phosphatase 79 45 - 117 U/L    Protein, total 6.4 6.4 - 8.2 g/dL    Albumin 3.7 3.5 - 5.0 g/dL    Globulin 2.7 2.0 - 4.0 g/dL    A-G Ratio 1.4 1.1 - 2.2     URINALYSIS W/MICROSCOPIC    Collection Time: 05/06/18  7:38 AM   Result Value Ref Range    Color YELLOW/STRAW      Appearance CLEAR CLEAR      Specific gravity 1.009 1.003 - 1.030      pH (UA) 7.5 5.0 - 8.0      Protein NEGATIVE  NEG mg/dL    Glucose NEGATIVE  NEG mg/dL    Ketone NEGATIVE  NEG mg/dL    Bilirubin NEGATIVE  NEG      Blood NEGATIVE  NEG      Urobilinogen 0.2 0.2 - 1.0 EU/dL    Nitrites NEGATIVE  NEG      Leukocyte Esterase NEGATIVE  NEG      WBC 0-4 0 - 4 /hpf    RBC 0-5 0 - 5 /hpf    Epithelial cells FEW FEW /lpf    Bacteria NEGATIVE  NEG /hpf    Hyaline cast 0-2 0 - 5 /lpf   PHOSPHORUS    Collection Time: 05/06/18  7:38 AM   Result Value Ref Range    Phosphorus 3.5 2.6 - 4.7 MG/DL   MAGNESIUM    Collection Time: 05/06/18  7:38 AM   Result Value Ref Range    Magnesium 2.3 1.6 - 2.4 mg/dL   CK W/ CKMB & INDEX    Collection Time: 05/06/18  7:38 AM   Result Value Ref Range    CK 78 26 - 192 U/L    CK - MB <1.0 <3.6 NG/ML    CK-MB Index Cannot be calculated 0 - 2.5     TSH 3RD GENERATION    Collection Time: 05/06/18  7:38 AM   Result Value Ref Range    TSH 2.42 0.36 - 3.74 uIU/mL   D DIMER    Collection Time: 05/06/18  7:38 AM   Result Value Ref Range    D-dimer 0.19 0.00 - 0.65 mg/L FEU   LIPASE    Collection Time: 05/06/18  7:38 AM   Result Value Ref Range    Lipase 111 73 - 393 U/L   AMYLASE    Collection Time: 05/06/18  7:38 AM   Result Value Ref Range    Amylase 40 25 - 115 U/L   URINE CULTURE HOLD SAMPLE    Collection Time: 05/06/18  7:38 AM   Result Value Ref Range    Urine culture hold        URINE ON HOLD IN MICROBIOLOGY DEPT FOR 3 DAYS. IF UNPRESERVED URINE IS SUBMITTED, IT CANNOT BE USED FOR ADDITIONAL TESTING AFTER 24 HRS, RECOLLECTION WILL BE REQUIRED.        Xiao Galarza MD

## 2018-05-06 NOTE — ED TRIAGE NOTES
TRIAGE: Patient arrives from home, escorted by , for high blood pressure. Patient states that she had some blood in her urine on Monday and her doctor Julian Pederson) and she sent her urine on Tuesday for results. She was recommended to see urology (Dr. Maxwell Ann), whom she has an appointment with on Monday. Patient had EKG and XR done yesterday here at [facility] and was discharged for evaluation of high blood pressure. Patient states she got up from watching television and went upstairs to go to sleep this evening. She checked her blood pressure in her LEFT arm which was 190/101 around 2300. She states that she was unable to get a reading in the RIGHT arm. She complains of intermittent nausea and constant dry mouth.

## 2018-05-06 NOTE — DISCHARGE INSTRUCTIONS
Discharge Instructions       PATIENT ID: Lisa Solitario  MRN: [id number]   YOB: 1955    DATE OF ADMISSION: 5/6/2018 12:03 AM    DATE OF DISCHARGE: 5/6/2018    PRIMARY CARE PROVIDER: Yuliya Cat MD     ATTENDING PHYSICIAN: Carlos Quispe MD  DISCHARGING PROVIDER: Carlos Quispe MD    To contact this individual call [phone] and ask the  to page. If unavailable ask to be transferred the Adult Hospitalist Department. DISCHARGE DIAGNOSES   Chest pain  Essential Hypertension  Palpitations  Hyperlipidemia  Macroscopic Hematuria         Chest Pain: Care Instructions  Your Care Instructions    There are many things that can cause chest pain. Some are not serious and will get better on their own in a few days. But some kinds of chest pain need more testing and treatment. Your doctor may have recommended a follow-up visit in the next 8 to 12 hours. If you are not getting better, you may need more tests or treatment. Even though your doctor has released you, you still need to watch for any problems. The doctor carefully checked you, but sometimes problems can develop later. If you have new symptoms or if your symptoms do not get better, get medical care right away. If you have worse or different chest pain or pressure that lasts more than 5 minutes or you passed out (lost consciousness), call 911 or seek other emergency help right away. A medical visit is only one step in your treatment. Even if you feel better, you still need to do what your doctor recommends, such as going to all suggested follow-up appointments and taking medicines exactly as directed. This will help you recover and help prevent future problems. How can you care for yourself at home? · Rest until you feel better. · Take your medicine exactly as prescribed. Call your doctor if you think you are having a problem with your medicine. · Do not drive after taking a prescription pain medicine.   When should you call for help? Call 911 if:  ? · You passed out (lost consciousness). ? · You have severe difficulty breathing. ? · You have symptoms of a heart attack. These may include:  ¨ Chest pain or pressure, or a strange feeling in your chest.  ¨ Sweating. ¨ Shortness of breath. ¨ Nausea or vomiting. ¨ Pain, pressure, or a strange feeling in your back, neck, jaw, or upper belly or in one or both shoulders or arms. ¨ Lightheadedness or sudden weakness. ¨ A fast or irregular heartbeat. After you call 911, the  may tell you to chew 1 adult-strength or 2 to 4 low-dose aspirin. Wait for an ambulance. Do not try to drive yourself. ?Call your doctor today if:  ? · You have any trouble breathing. ? · Your chest pain gets worse. ? · You are dizzy or lightheaded, or you feel like you may faint. ? · You are not getting better as expected. ? · You are having new or different chest pain. Where can you learn more? Go to http://milton-vahid.info/. Enter A120 in the search box to learn more about \"Chest Pain: Care Instructions. \"  Current as of: March 20, 2017  Content Version: 11.4  © 4197-2139 [employer]. Care instructions adapted under license by [employer] (which disclaims liability or warranty for this information). If you have questions about a medical condition or this instruction, always ask your healthcare professional. [place] [address] any warranty or liability for your use of this information.       CONSULTATIONS: IP CONSULT TO CARDIOLOGY    PROCEDURES/SURGERIES: * No surgery found *    PENDING TEST RESULTS:   At the time of discharge the following test results are still pending: None    FOLLOW UP APPOINTMENTS:   Follow-up Information     Follow up With Details Comments 5 Yajaira Crooks III, MD   [address] [place] [address]  [phone]             ADDITIONAL CARE RECOMMENDATIONS: None    DIET: Cardiac Diet  Oral Nutritional Supplements: No Oral Supplement prescribed      ACTIVITY: Activity as tolerated    WOUND CARE:  None    EQUIPMENT needed: None      DISCHARGE MEDICATIONS:   See Medication Reconciliation Form    · It is important that you take the medication exactly as they are prescribed. · Keep your medication in the bottles provided by the pharmacist and keep a list of the medication names, dosages, and times to be taken in your wallet. · Do not take other medications without consulting your doctor. NOTIFY YOUR PHYSICIAN FOR ANY OF THE FOLLOWING:   Fever over 101 degrees for 24 hours. Chest pain, shortness of breath, fever, chills, nausea, vomiting, diarrhea, change in mentation, falling, weakness, bleeding. Severe pain or pain not relieved by medications. Or, any other signs or symptoms that you may have questions about.       DISPOSITION:    Home With:   OT  PT  HH  RN       SNF/Inpatient Rehab/LTAC   X Independent/assisted living    Hospice    Other:     CDMP Checked:   Yes x     PROBLEM LIST Updated:  Yes x       Signed:   Wolfgang Roth MD  5/6/2018  12:33 PM

## 2018-05-06 NOTE — H&P
[address] [place]   HISTORY AND PHYSICAL      Karla Matos  MR#: [id number]  : 1955  ACCOUNT #: [de-identified]   ADMIT DATE: 2018    Order for observation was placed at about 0330 hours and the patient was seen shortly after that. PRIMARY CARE PHYSICIAN:  Divya Sutton MD    SOURCE OF INFORMATION:  The patient and patient's spouse, who was present at the bedside. CHIEF COMPLAINT:  Elevated blood pressure. HISTORY OF PRESENT ILLNESS:  This is a 70-year-old woman with a past medical history significant for gastroesophageal reflux disease, dyslipidemia, rosacea on doxycycline was in her usual state of health until a couple of days ago when patient noticed that she was passing pink urine. Patient developed these symptoms while she was driving home from her trip. She has a history of kidney stones. Patient also explained some left flank pain. She went to her primary care doctor. The patient was told that she has blood in her urine and was referred to the urologist.  The appointment with the urologist is this coming Monday. Because of the blood in the urine, patient went on the internet and started reading about the significance of blood in urine and came across the fact that blood in the urine can affect kidney disease and kidney disease can lead to high blood pressure. Patient checked her blood pressure, it was elevated. Patient also developed chest discomfort, which the patient described as a chest tightness, which is intermittent, 2/10 in severity. No associated shortness of breath. No known relieving factors. The patient also stated that she is more anxious because of what she was reading on the internet. Came to the emergency room first and was found to have elevated blood pressure.   Patient received a dose of clonidine, was discharged home and advised to follow up with her primary care physician and cardiologist.  The patient came back to the emergency room for the second time because of the elevated blood pressure and intermittent chest tightness. No associated nausea, no vomiting. Patient at this time was referred to the hospitalist service for placement under observation. PAST MEDICAL HISTORY:  Gastroesophageal reflux disease, rosacea, dyslipidemia. ALLERGIES:  THE PATIENT IS ALLERGIC TO MORPHINE AND Dianne Maudlin. MEDICATIONS:  Aspirin 81 mg daily, doxycycline 100 mg twice daily. FAMILY HISTORY:  This was reviewed. Her mother had heart disease. Her father had cancer, the type of cancer is not known. PAST SURGICAL HISTORY:  Significant for bladder suspension, tubal ligation. SOCIAL HISTORY:  No history of tobacco abuse. Patient admits to social consumption of alcohol. REVIEW OF SYSTEMS:    HEAD, EYES, EARS, NOSE AND THROAT:  This is positive for headache. No blurring of vision, no photophobia. RESPIRATORY:  No cough, no shortness of breath. No hemoptysis. CARDIOVASCULAR:  This is positive for chest pain. No orthopnea, no palpitation. GASTROINTESTINAL:  This is positive for nausea. No vomiting, no diarrhea, no constipation. GENITOURINARY:  No dysuria, no urgency, no frequency. All other systems are reviewed and they are negative. PHYSICAL EXAMINATION:  GENERAL APPEARANCE:  The patient appeared ill, in moderate distress. VITAL SIGNS:  On arrival at the emergency room, temperature 98.9, pulse 73, respiratory rate 18, blood pressure 167/100, oxygen saturation 100% on room air. HEAD:  Normocephalic, atraumatic. EYES:  Normal eye movement, no redness, no drainage, no discharge. EARS:  Normal external ears with no obvious drainage. NOSE:  No deformity, no drainage. MOUTH AND THROAT:  No visible oral lesions. NECK:  Supple, no JVD, no thyromegaly. CHEST:  Clear breath sounds. No wheezing, no crackles. HEART:  Normal S1 and S2, regular. No clinically appreciable murmur.   ABDOMEN:  Soft, nontender, normal bowel sounds. CENTRAL NERVOUS SYSTEM:  Alert, oriented x 3. No gross focal neurological deficit. EXTREMITIES:  No edema. Pulses 2+ bilaterally. MUSCULOSKELETAL:  No obvious joint deformity or swelling. SKIN:  No active skin lesions seen on the exposed part of the body. PSYCHIATRIC:  Normal mood and affect. LYMPHATICS:  No cervical lymphadenopathy. DIAGNOSTIC DATA:  EKG shows normal sinus rhythm, no ST and T-wave abnormalities. CT scan of the abdomen and pelvis without contrast shows a small nonobstructing right lower pole renal stone. LABORATORY DATA:  Cardiac profile:  Troponin is less than 0.04. Chemistry:  Sodium 144, potassium 3.5, chloride 108, CO2 of 29, glucose 100, BUN 20, creatinine 0.80, calcium 8.7, bilirubin total 0.8, ALT 19, AST 12, alkaline phosphatase 79, total protein 6.4, albumin level 3.7, globulin 2.7. Hematology:  WBC 9.2, hemoglobin 12.3, hematocrit 37.3, platelet 007. ASSESSMENT:  1. Chest pain. 2.  Elevated blood pressure. 3.  Right renal stone. 4.  Rosacea. PLAN:  1. Chest pain. We will place the patient on observation. We will obtain serial cardiac markers to rule out acute myocardial infarction as a possible cause of chest pain. We will start the patient on aspirin and a beta-blocker. Cardiology consult will be requested to assist in the evaluation and treatment of chest pain. Patient will also be evaluated for atypical causes of chest pain by checking amylase and lipase level. Will also check a D-dimer to evaluate the patient for thromboembolism as a possible cause of chest pain. 2.  Elevated blood pressure. The patient has no history of hypertension. Patient will be started on beta-blocker for chest pain. This will also help treat the patient's elevated blood pressure. The patient most likely has a new onset essential hypertension. We will check a TSH level. We will also obtain a chest x-ray. 3.  Right renal stone. This is nonobstructing.   The patient will follow up with the urologist as outpatient as scheduled. 4.  Rosacea. We will continue with preadmission medication. 5.  Other issues. CODE STATUS:  The patient is a FULL CODE. We will place the patient on Lovenox for DVT prophylaxis.       Yolanda Joe MD       RE/SN  D: 05/06/2018 05:46     T: 05/06/2018 08:58  JOB #: [id number]  CC: Genet Sheldon MD

## 2018-05-06 NOTE — IP AVS SNAPSHOT
[address] 
[phone] Patient: Agata Melissa MRN: [id number] :1955 A check mary indicates which time of day the medication should be taken. My Medications START taking these medications Instructions Each Dose to Equal  
 Morning Noon Evening Bedtime  
 carvedilol 6.25 mg tablet Commonly known as:  Ana De Luna Your last dose was: Your next dose is: Take 1 Tab by mouth two (2) times daily (with meals). 6.25 mg  
    
   
   
   
  
 pantoprazole 40 mg tablet Commonly known as:  PROTONIX Start taking on:  2018 Your last dose was: Your next dose is: Take 1 Tab by mouth Daily (before breakfast). 40 mg CONTINUE taking these medications Instructions Each Dose to Equal  
 Morning Noon Evening Bedtime  
 aspirin delayed-release 81 mg tablet Your last dose was: Your next dose is: Take 81 mg by mouth daily. 81 mg  
    
   
   
   
  
 B COMPLEX 1 tablet Generic drug:  b complex vitamins Your last dose was: Your next dose is: Take 1 Tab by mouth daily. 1 Tab  
    
   
   
   
  
 clobetasol 0.05 % topical cream  
Commonly known as:  Coye Shear Your last dose was: Your next dose is:    
   
   
 APPLY TOPICALLY TO AFFECTED AREA TWICE DAILY  
     
   
   
   
  
 doxycycline 100 mg tablet Commonly known as:  VIBRA-TABS Your last dose was: Your next dose is: TAKE 1 TABLET TWICE A DAY  
     
   
   
   
  
 VITAMIN D3 1,000 unit tablet Generic drug:  cholecalciferol Your last dose was: Your next dose is: Take 1,000 Units by mouth daily. 1000 Units Where to Get Your Medications These medications were sent to [address] - [facility] [address] Phone:  [phone]  
  carvedilol 6.25 mg tablet  
 pantoprazole 40 mg tablet

## 2018-05-07 ENCOUNTER — OFFICE VISIT (OUTPATIENT)
Dept: INTERNAL MEDICINE CLINIC | Age: 63
End: 2018-05-07

## 2018-05-07 VITALS
OXYGEN SATURATION: 98 % | WEIGHT: 143 LBS | DIASTOLIC BLOOD PRESSURE: 80 MMHG | BODY MASS INDEX: 24.41 KG/M2 | RESPIRATION RATE: 16 BRPM | HEIGHT: 64 IN | SYSTOLIC BLOOD PRESSURE: 142 MMHG | TEMPERATURE: 98.3 F | HEART RATE: 84 BPM

## 2018-05-07 DIAGNOSIS — I10 ESSENTIAL HYPERTENSION: Primary | ICD-10-CM

## 2018-05-07 DIAGNOSIS — E78.2 MIXED HYPERLIPIDEMIA: ICD-10-CM

## 2018-05-07 NOTE — PROGRESS NOTES
HPI:  Myrna Clarke is a 58y.o. year old female who is here for a follow-up for recent issues with her blood pressure. She called in last week and was complaining about gross hematuria. She had a urinalysis revealing some microscopic hematuria. She then started checking her blood pressures and they were quite elevated at home to 190/110. She started reading about kidney disease and blood in her urine and became anxious about it. She was seen in the emergency room on Friday and discharged back to home. She went back on Saturday with a blood pressure of 190/104 and was admitted for an overnight stay. She had a CT scan of the abdomen revealing 1 small right kidney stone. She was placed on carvedilol 6.25 mg twice per day and has taken 3 doses. Her only change in habits had been implementation of Aleve which she had been taking for headaches over the last couple weeks. She was just discharged home yesterday. She was referred to cardiology but has not seen them yet. There was a question about a change in her EKG that was not confirmed by Dr. Dereck Avalos when he saw her in the hospital.  She denies any exertional chest pain. Denies any increased shortness of breath. No PND orthopnea. She thinks that her blood pressure monitor may not be accurate. Past Medical History:   Diagnosis Date    Fracture 2016    Right foot fx (pt turned foot while stepping off curb)    GERD (gastroesophageal reflux disease)     HX    Headache(784.0) 12/17/2009    Mixed hyperlipidemia 12/17/2009    Rosacea 2017       Past Surgical History:   Procedure Laterality Date    HX BLADDER SUSPENSION  5/2014    HX DILATION AND CURETTAGE      HX TUBAL LIGATION      TITA US BX BREAST LT 1ST LESION W/CLIP AND SPECIMEN Left 12/09/2016    benign       Prior to Admission medications    Medication Sig Start Date End Date Taking?  Authorizing Provider   carvedilol (COREG) 6.25 mg tablet Take 1 Tab by mouth two (2) times daily (with meals). 5/6/18  Yes Nav Garnica MD   pantoprazole (PROTONIX) 40 mg tablet Take 1 Tab by mouth Daily (before breakfast). 5/7/18  Yes Nav Garnica MD   clobetasol (TEMOVATE) 0.05 % topical cream APPLY TOPICALLY TO AFFECTED AREA TWICE DAILY 11/14/17  Yes Historical Provider   doxycycline (VIBRA-TABS) 100 mg tablet TAKE 1 TABLET TWICE A DAY 10/29/17  Yes Historical Provider   b complex vitamins (B COMPLEX 1) tablet Take 1 Tab by mouth daily. 6/2/15   Dorys Saab III, MD   cholecalciferol (VITAMIN D3) 1,000 unit tablet Take 1,000 Units by mouth daily. Historical Provider   aspirin delayed-release 81 mg tablet Take 81 mg by mouth daily. Historical Provider       Social History     Social History    Marital status:      Spouse name: N/A    Number of children: N/A    Years of education: N/A     Occupational History    Not on file. Social History Main Topics    Smoking status: Never Smoker    Smokeless tobacco: Never Used    Alcohol use 1.0 oz/week     2 Glasses of wine per week      Comment: WEEKLY    Drug use: No    Sexual activity: Yes     Partners: Male     Other Topics Concern    Not on file     Social History Narrative          ROS  Per HPI    Visit Vitals    /80    Pulse 84    Temp 98.3 °F (36.8 °C) (Oral)    Resp 16    Ht 5' 4\" (1.626 m)    Wt 143 lb (64.9 kg)    SpO2 98%    BMI 24.55 kg/m2         Physical Exam   Physical Examination: General appearance - alert, well appearing, and in no distress  Mouth - mucous membranes moist, pharynx normal without lesions  Neck - supple, no significant adenopathy  Lymphatics - no palpable lymphadenopathy, no hepatosplenomegaly  Chest - clear to auscultation, no wheezes, rales or rhonchi, symmetric air entry  Heart - normal rate, regular rhythm, normal S1, S2, no murmurs, rubs, clicks or gallops  Abdomen - soft, nontender, nondistended, no masses or organomegaly      Assessment/Plan:  Diagnoses and all orders for this visit:    1. Essential hypertension -? Controlled. Will have her get a new blood pressure monitor and continue to check her readings. She will be referred to cardiology for atypical chest discomfort and also her blood pressure. We will help make arrangements for her to see Dr. Ela Hopson as she had had a stress test done by her in the last couple years. She will also discontinue her Aleve. She was given information about DASH diet to work on sodium reduction. She will follow-up after the above. [facility] [address]    2. Mixed hyperlipidemia  -     [facility] [address] [facility] [address]  3. Microscopic hematuriashe saw urology this morning and had a cystoscopy that was negative. No further intervention warranted. Follow-up Disposition: 1 month       Advised her to call back or return to office if symptoms worsen/change/persist.  Discussed expected course/resolution/complications of diagnosis in detail with patient. Medication risks/benefits/costs/interactions/alternatives discussed with patient. She was given an after visit summary which includes diagnoses, current medications, & vitals. She expressed understanding with the diagnosis and plan.

## 2018-05-07 NOTE — PATIENT INSTRUCTIONS
DASH Diet: Care Instructions  Your Care Instructions    The DASH diet is an eating plan that can help lower your blood pressure. DASH stands for Dietary Approaches to Stop Hypertension. Hypertension is high blood pressure. The DASH diet focuses on eating foods that are high in calcium, potassium, and magnesium. These nutrients can lower blood pressure. The foods that are highest in these nutrients are fruits, vegetables, low-fat dairy products, nuts, seeds, and legumes. But taking calcium, potassium, and magnesium supplements instead of eating foods that are high in those nutrients does not have the same effect. The DASH diet also includes whole grains, fish, and poultry. The DASH diet is one of several lifestyle changes your doctor may recommend to lower your high blood pressure. Your doctor may also want you to decrease the amount of sodium in your diet. Lowering sodium while following the DASH diet can lower blood pressure even further than just the DASH diet alone. Follow-up care is a key part of your treatment and safety. Be sure to make and go to all appointments, and call your doctor if you are having problems. It's also a good idea to know your test results and keep a list of the medicines you take. How can you care for yourself at home? Following the DASH diet  · Eat 4 to 5 servings of fruit each day. A serving is 1 medium-sized piece of fruit, ½ cup chopped or canned fruit, 1/4 cup dried fruit, or 4 ounces (½ cup) of fruit juice. Choose fruit more often than fruit juice. · Eat 4 to 5 servings of vegetables each day. A serving is 1 cup of lettuce or raw leafy vegetables, ½ cup of chopped or cooked vegetables, or 4 ounces (½ cup) of vegetable juice. Choose vegetables more often than vegetable juice. · Get 2 to 3 servings of low-fat and fat-free dairy each day. A serving is 8 ounces of milk, 1 cup of yogurt, or 1 ½ ounces of cheese. · Eat 6 to 8 servings of grains each day.  A serving is 1 slice of bread, 1 ounce of dry cereal, or ½ cup of cooked rice, pasta, or cooked cereal. Try to choose whole-grain products as much as possible. · Limit lean meat, poultry, and fish to 2 servings each day. A serving is 3 ounces, about the size of a deck of cards. · Eat 4 to 5 servings of nuts, seeds, and legumes (cooked dried beans, lentils, and split peas) each week. A serving is 1/3 cup of nuts, 2 tablespoons of seeds, or ½ cup of cooked beans or peas. · Limit fats and oils to 2 to 3 servings each day. A serving is 1 teaspoon of vegetable oil or 2 tablespoons of salad dressing. · Limit sweets and added sugars to 5 servings or less a week. A serving is 1 tablespoon jelly or jam, ½ cup sorbet, or 1 cup of lemonade. · Eat less than 2,300 milligrams (mg) of sodium a day. If you limit your sodium to 1,500 mg a day, you can lower your blood pressure even more. Tips for success  · Start small. Do not try to make dramatic changes to your diet all at once. You might feel that you are missing out on your favorite foods and then be more likely to not follow the plan. Make small changes, and stick with them. Once those changes become habit, add a few more changes. · Try some of the following:  ¨ Make it a goal to eat a fruit or vegetable at every meal and at snacks. This will make it easy to get the recommended amount of fruits and vegetables each day. ¨ Try yogurt topped with fruit and nuts for a snack or healthy dessert. ¨ Add lettuce, tomato, cucumber, and onion to sandwiches. ¨ Combine a ready-made pizza crust with low-fat mozzarella cheese and lots of vegetable toppings. Try using tomatoes, squash, spinach, broccoli, carrots, cauliflower, and onions. ¨ Have a variety of cut-up vegetables with a low-fat dip as an appetizer instead of chips and dip. ¨ Sprinkle sunflower seeds or chopped almonds over salads. Or try adding chopped walnuts or almonds to cooked vegetables.   ¨ Try some vegetarian meals using beans and peas. Add garbanzo or kidney beans to salads. Make burritos and tacos with mashed bray beans or black beans. Where can you learn more? Go to http://milton-vahid.info/. Enter J140 in the search box to learn more about \"DASH Diet: Care Instructions. \"  Current as of: September 21, 2016  Content Version: 11.4  © 2285-2900 [employer]. Care instructions adapted under license by [employer] (which disclaims liability or warranty for this information). If you have questions about a medical condition or this instruction, always ask your healthcare professional. [address] any warranty or liability for your use of this information. High Blood Pressure: Care Instructions  Your Care Instructions    If your blood pressure is usually above 140/90, you have high blood pressure, or hypertension. That means the top number is 140 or higher or the bottom number is 90 or higher, or both. Despite what a lot of people think, high blood pressure usually doesn't cause headaches or make you feel dizzy or lightheaded. It usually has no symptoms. But it does increase your risk for heart attack, stroke, and kidney or eye damage. The higher your blood pressure, the more your risk increases. Your doctor will give you a goal for your blood pressure. Your goal will be based on your health and your age. An example of a goal is to keep your blood pressure below 140/90. Lifestyle changes, such as eating healthy and being active, are always important to help lower blood pressure. You might also take medicine to reach your blood pressure goal.  Follow-up care is a key part of your treatment and safety. Be sure to make and go to all appointments, and call your doctor if you are having problems. It's also a good idea to know your test results and keep a list of the medicines you take. How can you care for yourself at home?   Medical treatment  · If you stop taking your medicine, your blood pressure will go back up. You may take one or more types of medicine to lower your blood pressure. Be safe with medicines. Take your medicine exactly as prescribed. Call your doctor if you think you are having a problem with your medicine. · Talk to your doctor before you start taking aspirin every day. Aspirin can help certain people lower their risk of a heart attack or stroke. But taking aspirin isn't right for everyone, because it can cause serious bleeding. · See your doctor regularly. You may need to see the doctor more often at first or until your blood pressure comes down. · If you are taking blood pressure medicine, talk to your doctor before you take decongestants or anti-inflammatory medicine, such as ibuprofen. Some of these medicines can raise blood pressure. · Learn how to check your blood pressure at home. Lifestyle changes  · Stay at a healthy weight. This is especially important if you put on weight around the waist. Losing even 10 pounds can help you lower your blood pressure. · If your doctor recommends it, get more exercise. Walking is a good choice. Bit by bit, increase the amount you walk every day. Try for at least 30 minutes on most days of the week. You also may want to swim, bike, or do other activities. · Avoid or limit alcohol. Talk to your doctor about whether you can drink any alcohol. · Try to limit how much sodium you eat to less than 2,300 milligrams (mg) a day. Your doctor may ask you to try to eat less than 1,500 mg a day. · Eat plenty of fruits (such as bananas and oranges), vegetables, legumes, whole grains, and low-fat dairy products. · Lower the amount of saturated fat in your diet. Saturated fat is found in animal products such as milk, cheese, and meat. Limiting these foods may help you lose weight and also lower your risk for heart disease. · Do not smoke. Smoking increases your risk for heart attack and stroke.  If you need help quitting, talk to your doctor about stop-smoking programs and medicines. These can increase your chances of quitting for good. When should you call for help? Call 911 anytime you think you may need emergency care. This may mean having symptoms that suggest that your blood pressure is causing a serious heart or blood vessel problem. Your blood pressure may be over 180/110. ? For example, call 911 if:  ? · You have symptoms of a heart attack. These may include:  ¨ Chest pain or pressure, or a strange feeling in the chest.  ¨ Sweating. ¨ Shortness of breath. ¨ Nausea or vomiting. ¨ Pain, pressure, or a strange feeling in the back, neck, jaw, or upper belly or in one or both shoulders or arms. ¨ Lightheadedness or sudden weakness. ¨ A fast or irregular heartbeat. ? · You have symptoms of a stroke. These may include:  ¨ Sudden numbness, tingling, weakness, or loss of movement in your face, arm, or leg, especially on only one side of your body. ¨ Sudden vision changes. ¨ Sudden trouble speaking. ¨ Sudden confusion or trouble understanding simple statements. ¨ Sudden problems with walking or balance. ¨ A sudden, severe headache that is different from past headaches. ? · You have severe back or belly pain. ?Do not wait until your blood pressure comes down on its own. Get help right away. ?Call your doctor now or seek immediate care if:  ? · Your blood pressure is much higher than normal (such as 180/110 or higher), but you don't have symptoms. ? · You think high blood pressure is causing symptoms, such as:  ¨ Severe headache. ¨ Blurry vision. ? Watch closely for changes in your health, and be sure to contact your doctor if:  ? · Your blood pressure measures 140/90 or higher at least 2 times. That means the top number is 140 or higher or the bottom number is 90 or higher, or both. ? · You think you may be having side effects from your blood pressure medicine.    ? · Your blood pressure is usually normal, but it goes above normal at least 2 times. Where can you learn more? Go to http://milton-vahid.info/. Enter Z334 in the search box to learn more about \"High Blood Pressure: Care Instructions. \"  Current as of: September 21, 2016  Content Version: 11.4  © 1020-4698 [employer]. Care instructions adapted under license by [employer] (which disclaims liability or warranty for this information). If you have questions about a medical condition or this instruction, always ask your healthcare professional. [address] any warranty or liability for your use of this information.

## 2018-05-07 NOTE — MR AVS SNAPSHOT
[address] [facility] [address] [place] [address] 
[phone] Patient: Ami Elizondo MRN: [id number] :[id number] Visit Information Date & Time Provider Department Dept. Phone Encounter #  
 2018 11:15 AM Bryanna Chow MD [facility] Internal Medicine [phone] [id number] Your Appointments 5/10/2018  3:45 PM  
ROUTINE CARE with Bryanna Chow MD  
[facility] Internal Medicine [address]) Appt Note: f/u from [address] [facility] [address] [facility] [address]  
[address] [place] [address] 57 Upcoming Health Maintenance Date Due  
 PAP AKA CERVICAL CYTOLOGY 2016 Influenza Age 5 to Adult 2018 BREAST CANCER SCRN MAMMOGRAM 2019 DTaP/Tdap/Td series (2 - Td) 2021 COLONOSCOPY 2025 Allergies as of 2018  Review Complete On: 2018 By: Yuriy Perry LPN Severity Noted Reaction Type Reactions Morphine  2014    Nausea Only Zithromax [Azithromycin]  2009    Nausea Only Current Immunizations  Reviewed on 2017 Name Date TDAP Vaccine 2011, 2009 Zoster Vaccine, Live 2013 Not reviewed this visit You Were Diagnosed With   
  
 Codes Comments Essential hypertension    -  Primary ICD-10-CM: I10 
ICD-9-CM: 401.9 Mixed hyperlipidemia     ICD-10-CM: E78.2 ICD-9-CM: 272.2 Vitals BP Pulse Temp Resp Height(growth percentile) Weight(growth percentile) 142/80 84 98.3 °F (36.8 °C) (Oral) 16 5' 4\" (1.626 m) 143 lb (64.9 kg) SpO2 BMI OB Status Smoking Status 98% 24.55 kg/m2 Postmenopausal Never Smoker Vitals History BMI and BSA Data Body Mass Index Body Surface Area 24.55 kg/m 2 1.71 m 2 Preferred Pharmacy Pharmacy Name Phone [address] [phone] Your Updated Medication List  
  
   
This list is accurate as of 5/7/18 12:19 PM.  Always use your most recent med list.  
  
  
  
  
 aspirin delayed-release 81 mg tablet Take 81 mg by mouth daily. B COMPLEX 1 tablet Generic drug:  b complex vitamins Take 1 Tab by mouth daily. carvedilol 6.25 mg tablet Commonly known as:  W[place] Take 1 Tab by mouth two (2) times daily (with meals). clobetasol 0.05 % topical cream  
Commonly known as:  TEMOVATE  
APPLY TOPICALLY TO AFFECTED AREA TWICE DAILY  
  
 doxycycline 100 mg tablet Commonly known as:  VIBRA-TABS TAKE 1 TABLET TWICE A DAY pantoprazole 40 mg tablet Commonly known as:  PROTONIX Take 1 Tab by mouth Daily (before breakfast). VITAMIN D3 1,000 unit tablet Generic drug:  cholecalciferol Take 1,000 Units by mouth daily. We Performed the Following REFERRAL TO CARDIOLOGY [VFX16 Custom] Referral Information Referral ID Referred By Referred To  
  
 [id number] [facility] [place]  [address] Phone: [phone] Fax: [phone] Visits Status Start Date End Date 1 New Request 5/7/18 5/7/19 If your referral has a status of pending review or denied, additional information will be sent to support the outcome of this decision. Patient Instructions DASH Diet: Care Instructions Your Care Instructions The DASH diet is an eating plan that can help lower your blood pressure. DASH stands for Dietary Approaches to Stop Hypertension. Hypertension is high blood pressure. The DASH diet focuses on eating foods that are high in calcium, potassium, and magnesium. These nutrients can lower blood pressure. The foods that are highest in these nutrients are fruits, vegetables, low-fat dairy products, nuts, seeds, and legumes.  But taking calcium, potassium, and magnesium supplements instead of eating foods that are high in those nutrients does not have the same effect. The DASH diet also includes whole grains, fish, and poultry. The DASH diet is one of several lifestyle changes your doctor may recommend to lower your high blood pressure. Your doctor may also want you to decrease the amount of sodium in your diet. Lowering sodium while following the DASH diet can lower blood pressure even further than just the DASH diet alone. Follow-up care is a key part of your treatment and safety. Be sure to make and go to all appointments, and call your doctor if you are having problems. It's also a good idea to know your test results and keep a list of the medicines you take. How can you care for yourself at home? Following the DASH diet · Eat 4 to 5 servings of fruit each day. A serving is 1 medium-sized piece of fruit, ½ cup chopped or canned fruit, 1/4 cup dried fruit, or 4 ounces (½ cup) of fruit juice. Choose fruit more often than fruit juice. · Eat 4 to 5 servings of vegetables each day. A serving is 1 cup of lettuce or raw leafy vegetables, ½ cup of chopped or cooked vegetables, or 4 ounces (½ cup) of vegetable juice. Choose vegetables more often than vegetable juice. · Get 2 to 3 servings of low-fat and fat-free dairy each day. A serving is 8 ounces of milk, 1 cup of yogurt, or 1 ½ ounces of cheese. · Eat 6 to 8 servings of grains each day. A serving is 1 slice of bread, 1 ounce of dry cereal, or ½ cup of cooked rice, pasta, or cooked cereal. Try to choose whole-grain products as much as possible. · Limit lean meat, poultry, and fish to 2 servings each day. A serving is 3 ounces, about the size of a deck of cards. · Eat 4 to 5 servings of nuts, seeds, and legumes (cooked dried beans, lentils, and split peas) each week. A serving is 1/3 cup of nuts, 2 tablespoons of seeds, or ½ cup of cooked beans or peas. · Limit fats and oils to 2 to 3 servings each day. A serving is 1 teaspoon of vegetable oil or 2 tablespoons of salad dressing. · Limit sweets and added sugars to 5 servings or less a week. A serving is 1 tablespoon jelly or jam, ½ cup sorbet, or 1 cup of lemonade. · Eat less than 2,300 milligrams (mg) of sodium a day. If you limit your sodium to 1,500 mg a day, you can lower your blood pressure even more. Tips for success · Start small. Do not try to make dramatic changes to your diet all at once. You might feel that you are missing out on your favorite foods and then be more likely to not follow the plan. Make small changes, and stick with them. Once those changes become habit, add a few more changes. · Try some of the following: ¨ Make it a goal to eat a fruit or vegetable at every meal and at snacks. This will make it easy to get the recommended amount of fruits and vegetables each day. ¨ Try yogurt topped with fruit and nuts for a snack or healthy dessert. ¨ Add lettuce, tomato, cucumber, and onion to sandwiches. ¨ Combine a ready-made pizza crust with low-fat mozzarella cheese and lots of vegetable toppings. Try using tomatoes, squash, spinach, broccoli, carrots, cauliflower, and onions. ¨ Have a variety of cut-up vegetables with a low-fat dip as an appetizer instead of chips and dip. ¨ Sprinkle sunflower seeds or chopped almonds over salads. Or try adding chopped walnuts or almonds to cooked vegetables. ¨ Try some vegetarian meals using beans and peas. Add garbanzo or kidney beans to salads. Make burritos and tacos with mashed bray beans or black beans. Where can you learn more? Go to http://milton-vahid.info/. Enter M231 in the search box to learn more about \"DASH Diet: Care Instructions. \" Current as of: September 21, 2016 Content Version: 11.4 © 7623-3520 Healthwise, [employer].  Care instructions adapted under license by [address] (which disclaims liability or warranty for this information). If you have questions about a medical condition or this instruction, always ask your healthcare professional. [address] any warranty or liability for your use of this information. High Blood Pressure: Care Instructions Your Care Instructions If your blood pressure is usually above 140/90, you have high blood pressure, or hypertension. That means the top number is 140 or higher or the bottom number is 90 or higher, or both. Despite what a lot of people think, high blood pressure usually doesn't cause headaches or make you feel dizzy or lightheaded. It usually has no symptoms. But it does increase your risk for heart attack, stroke, and kidney or eye damage. The higher your blood pressure, the more your risk increases. Your doctor will give you a goal for your blood pressure. Your goal will be based on your health and your age. An example of a goal is to keep your blood pressure below 140/90. Lifestyle changes, such as eating healthy and being active, are always important to help lower blood pressure. You might also take medicine to reach your blood pressure goal. 
Follow-up care is a key part of your treatment and safety. Be sure to make and go to all appointments, and call your doctor if you are having problems. It's also a good idea to know your test results and keep a list of the medicines you take. How can you care for yourself at home? Medical treatment · If you stop taking your medicine, your blood pressure will go back up. You may take one or more types of medicine to lower your blood pressure. Be safe with medicines. Take your medicine exactly as prescribed. Call your doctor if you think you are having a problem with your medicine. · Talk to your doctor before you start taking aspirin every day.  Aspirin can help certain people lower their risk of a heart attack or stroke. But taking aspirin isn't right for everyone, because it can cause serious bleeding. · See your doctor regularly. You may need to see the doctor more often at first or until your blood pressure comes down. · If you are taking blood pressure medicine, talk to your doctor before you take decongestants or anti-inflammatory medicine, such as ibuprofen. Some of these medicines can raise blood pressure. · Learn how to check your blood pressure at home. Lifestyle changes · Stay at a healthy weight. This is especially important if you put on weight around the waist. Losing even 10 pounds can help you lower your blood pressure. · If your doctor recommends it, get more exercise. Walking is a good choice. Bit by bit, increase the amount you walk every day. Try for at least 30 minutes on most days of the week. You also may want to swim, bike, or do other activities. · Avoid or limit alcohol. Talk to your doctor about whether you can drink any alcohol. · Try to limit how much sodium you eat to less than 2,300 milligrams (mg) a day. Your doctor may ask you to try to eat less than 1,500 mg a day. · Eat plenty of fruits (such as bananas and oranges), vegetables, legumes, whole grains, and low-fat dairy products. · Lower the amount of saturated fat in your diet. Saturated fat is found in animal products such as milk, cheese, and meat. Limiting these foods may help you lose weight and also lower your risk for heart disease. · Do not smoke. Smoking increases your risk for heart attack and stroke. If you need help quitting, talk to your doctor about stop-smoking programs and medicines. These can increase your chances of quitting for good. When should you call for help? Call 911 anytime you think you may need emergency care.  This may mean having symptoms that suggest that your blood pressure is causing a serious heart or blood vessel problem. Your blood pressure may be over 180/110. ? For example, call 911 if: 
? · You have symptoms of a heart attack. These may include: ¨ Chest pain or pressure, or a strange feeling in the chest. 
¨ Sweating. ¨ Shortness of breath. ¨ Nausea or vomiting. ¨ Pain, pressure, or a strange feeling in the back, neck, jaw, or upper belly or in one or both shoulders or arms. ¨ Lightheadedness or sudden weakness. ¨ A fast or irregular heartbeat. ? · You have symptoms of a stroke. These may include: 
¨ Sudden numbness, tingling, weakness, or loss of movement in your face, arm, or leg, especially on only one side of your body. ¨ Sudden vision changes. ¨ Sudden trouble speaking. ¨ Sudden confusion or trouble understanding simple statements. ¨ Sudden problems with walking or balance. ¨ A sudden, severe headache that is different from past headaches. ? · You have severe back or belly pain. ?Do not wait until your blood pressure comes down on its own. Get help right away. ?Call your doctor now or seek immediate care if: 
? · Your blood pressure is much higher than normal (such as 180/110 or higher), but you don't have symptoms. ? · You think high blood pressure is causing symptoms, such as: ¨ Severe headache. ¨ Blurry vision. ? Watch closely for changes in your health, and be sure to contact your doctor if: 
? · Your blood pressure measures 140/90 or higher at least 2 times. That means the top number is 140 or higher or the bottom number is 90 or higher, or both. ? · You think you may be having side effects from your blood pressure medicine. ? · Your blood pressure is usually normal, but it goes above normal at least 2 times. Where can you learn more? Go to http://milton-vahid.info/. Enter K517 in the search box to learn more about \"High Blood Pressure: Care Instructions. \" Current as of: September 21, 2016 Content Version: 11.4 © 8219-6054 Healthwise, Incorporated. Care instructions adapted under license by [employer] (which disclaims liability or warranty for this information). If you have questions about a medical condition or this instruction, always ask your healthcare professional. [address] any warranty or liability for your use of this information. Introducing [facility]! Dear Roxan Kocher: Thank you for requesting a Vacatia account. Our records indicate that you already have an active Vacatia account. You can access your account anytime at [email or web address] Did you know that you can access your hospital and ER discharge instructions at any time in Vacatia? You can also review all of your test results from your hospital stay or ER visit. Additional Information If you have questions, please visit the Frequently Asked Questions section of the [employer]ia website at [email or web address] Remember, Vacatia is NOT to be used for urgent needs. For medical emergencies, dial 911. Now available from your iPhone and Android! Please provide this summary of care documentation to your next provider. Your primary care clinician is listed as Lilli[address] [phone] If you have any questions after today's visit, please call [phone].

## 2018-05-08 ENCOUNTER — TELEPHONE (OUTPATIENT)
Dept: CARDIOLOGY CLINIC | Age: 63
End: 2018-05-08

## 2018-05-08 NOTE — TELEPHONE ENCOUNTER
----- Message from Teresa Ye MD sent at 5/7/2018  9:28 PM EDT -----  I will get her in, thanks! Slava Singh  ----- Message -----     From: Yuliya Cat MD     Sent: 5/7/2018   6:29 PM       To: Treesa Ye MD    Good Doc - I saw this nice lady back today in clinic after 2 ER visits over the weekend with HTN and atypical chest pain. Vini Garrett saw her in the hospital. Her BP has still been up some but only 3 doses of coreg. She has a strong family history of CAD and I would like her to see you again for a visit soon. You did a stress test in the past on her. Can you help? She apparently is friends with your parents. Thanks for the help.  Chandra

## 2018-05-08 NOTE — TELEPHONE ENCOUNTER
Appointment scheduled.      Future Appointments  Date Time Provider Kathy Saba   5/10/2018 3:45 PM Kristopher Friedman [place] [address] [facility]   5/18/2018 2:00 PM Mari Baker  [address]

## 2018-05-18 ENCOUNTER — OFFICE VISIT (OUTPATIENT)
Dept: CARDIOLOGY CLINIC | Age: 63
End: 2018-05-18

## 2018-05-18 VITALS
RESPIRATION RATE: 16 BRPM | WEIGHT: 142.8 LBS | BODY MASS INDEX: 24.38 KG/M2 | DIASTOLIC BLOOD PRESSURE: 84 MMHG | HEIGHT: 64 IN | SYSTOLIC BLOOD PRESSURE: 134 MMHG | HEART RATE: 66 BPM

## 2018-05-18 DIAGNOSIS — E78.2 MIXED HYPERLIPIDEMIA: ICD-10-CM

## 2018-05-18 DIAGNOSIS — I10 ESSENTIAL HYPERTENSION: Primary | ICD-10-CM

## 2018-05-18 RX ORDER — NEBIVOLOL 10 MG/1
10 TABLET ORAL DAILY
Qty: 21 TAB | Refills: 0 | Status: SHIPPED | COMMUNITY
Start: 2018-05-18 | End: 2018-05-21 | Stop reason: SDUPTHER

## 2018-05-18 RX ORDER — NEBIVOLOL 10 MG/1
10 TABLET ORAL DAILY
Qty: 90 TAB | Refills: 3 | Status: SHIPPED | OUTPATIENT
Start: 2018-05-18 | End: 2018-05-18 | Stop reason: SDUPTHER

## 2018-05-18 RX ORDER — PANTOPRAZOLE SODIUM 40 MG/1
40 TABLET, DELAYED RELEASE ORAL
Qty: 30 TAB | Refills: 1 | Status: SHIPPED | OUTPATIENT
Start: 2018-05-18 | End: 2018-09-17 | Stop reason: ALTCHOICE

## 2018-05-18 NOTE — PROGRESS NOTES
[place]: Eben Joseph  [phone]  Requesting/referring provider: Dr. Dick Guardado  Reason for Consult: Dyspnea/HTN    HPI: Kerry Pulliam, a 58y.o. year-old who presents for evaluation of chest pain and htn. A few weeks ago, she was down at their [address] and had hematuria. UA came back ok, went to see urology. Generally her bp has been low but at times upper normal, 140s/80s usually but she had some htn with 150s. It started making her anxious and then she had BP as high as 180/100 and also with some chest discomfort/indigestion. A second trip back to the Er with BP in the 190/110 range sent her back to the ER, EKG was slightly different, cardiology was consulted, she went back to Dr. Dick Guardado. She has had some right sided headaches, feeling sinus-y. Tingling in the right arm but also has some right neck and shoulder OA/msk issues. Having some headaches no. Few palpitations, better than before. A little winded with climbing steps. But nothing major. No dizzy spells. No edema. No PND. Assessment/Plan:  1. Dyspnea- mild, not progressive, discussed HTN, etc.   2. HTN- change coreg to bystolic 05HQ- labile BP but actually doing better this last week. So will watch for now. Diastolics over 90 frequently so discussed headaches, etc associated  -reeval BP in two weeks salt watching, daily walking and then decide on need for additional testing. 3. GERD- doing well on Protonix, will continue 6 weeks rx today  4. Chest discomfort related to htn, none now, will watch. Ca score zero   Mom with HTN, smoker,  of MI at 78, had CVA in her 76s, brothers with HTN  Soc no tob 1-2 drinks a week  She  has a past medical history of Fracture (2016); GERD (gastroesophageal reflux disease); XFDHTZYV(523.1) (2009); Mixed hyperlipidemia (2009); and Rosacea (2017).     Cardiovascular ROS: positive for - dyspnea on exertion  Respiratory ROS: positive for - shortness of breath  Neurological ROS: no TIA or stroke symptoms  All other systems negative except as above. PE  Vitals:    05/18/18 1356   BP: 134/84   Pulse: 66   Resp: 16   Weight: 142 lb 12.8 oz (64.8 kg)   Height: 5' 4\" (1.626 m)    Body mass index is 24.51 kg/(m^2).    General appearance - alert, well appearing, and in no distress  Mental status - affect appropriate to mood  Eyes - sclera anicteric, moist mucous membranes  Neck - supple, no significant adenopathy  Lymphatics - no  lymphadenopathy  Chest - clear to auscultation, no wheezes, rales or rhonchi  Heart - normal rate, regular rhythm, normal S1, S2, no murmurs, rubs, clicks or gallops  Abdomen - soft, nontender, nondistended, no masses or organomegaly  Back exam - full range of motion, no tenderness  Neurological - cranial nerves II through XII grossly intact, no focal deficit  Musculoskeletal - no muscular tenderness noted, normal strength  Extremities - peripheral pulses normal, no pedal edema  Skin - normal coloration  no rashes    Recent Labs:  Lab Results   Component Value Date/Time    Cholesterol, total 169 12/05/2017 12:00 AM    HDL Cholesterol 46 12/05/2017 12:00 AM    LDL, calculated 107 (H) 12/05/2017 12:00 AM    Triglyceride 80 12/05/2017 12:00 AM    CHOL/HDL Ratio 3.9 12/18/2009 07:45 AM     Lab Results   Component Value Date/Time    Creatinine 0.80 05/06/2018 01:37 AM     Lab Results   Component Value Date/Time    BUN 20 05/06/2018 01:37 AM     Lab Results   Component Value Date/Time    Potassium 3.5 05/06/2018 01:37 AM     No results found for: HBA1C, HGBE8, GSU5RMYK  Lab Results   Component Value Date/Time    HGB 12.3 05/06/2018 01:37 AM     Lab Results   Component Value Date/Time    PLATELET 516 92/03/2482 01:37 AM       Reviewed:  Past Medical History:   Diagnosis Date    Fracture 2016    Right foot fx (pt turned foot while stepping off curb)    GERD (gastroesophageal reflux disease)     HX    Headache(784.0) 12/17/2009    Mixed hyperlipidemia 12/17/2009    Rosacea 2017     History   Smoking Status    Never Smoker   Smokeless Tobacco    Never Used     History   Alcohol Use    1.0 oz/week    2 Glasses of wine per week     Comment: WEEKLY     Allergies   Allergen Reactions    Morphine Nausea Only    Zithromax [Azithromycin] Nausea Only       Current Outpatient Prescriptions   Medication Sig    carvedilol (COREG) 6.25 mg tablet Take 1 Tab by mouth two (2) times daily (with meals).  pantoprazole (PROTONIX) 40 mg tablet Take 1 Tab by mouth Daily (before breakfast).  clobetasol (TEMOVATE) 0.05 % topical cream APPLY TOPICALLY TO AFFECTED AREA TWICE DAILY    doxycycline (VIBRA-TABS) 100 mg tablet TAKE 1 TABLET ONCE DAILY    b complex vitamins (B COMPLEX 1) tablet Take 1 Tab by mouth daily.  cholecalciferol (VITAMIN D3) 1,000 unit tablet Take 1,000 Units by mouth daily.  aspirin delayed-release 81 mg tablet Take 81 mg by mouth daily. No current facility-administered medications for this visit.         Pauline Alvarado MD  [facility] [place]  [address] [facility] [address] [place] [address]

## 2018-05-21 RX ORDER — NEBIVOLOL 10 MG/1
10 TABLET ORAL DAILY
Qty: 90 TAB | Refills: 3 | Status: SHIPPED | OUTPATIENT
Start: 2018-05-21 | End: 2019-05-30 | Stop reason: SDUPTHER

## 2018-05-21 NOTE — TELEPHONE ENCOUNTER
Requested Prescriptions     Signed Prescriptions Disp Refills    nebivolol (BYSTOLIC) 10 mg tablet 90 Tab 3     Sig: Take 1 Tab by mouth daily.      Authorizing Provider: Hudson Jang     Ordering User: Keegan Mejia     Per orders

## 2018-06-29 PROBLEM — H25.13 CATARACT, NUCLEAR SCLEROTIC, BOTH EYES: Status: ACTIVE | Noted: 2018-06-29

## 2018-06-29 RX ORDER — CARVEDILOL 6.25 MG/1
TABLET ORAL
Refills: 0 | COMMUNITY
Start: 2018-05-06 | End: 2018-09-17 | Stop reason: ALTCHOICE

## 2018-09-17 ENCOUNTER — OFFICE VISIT (OUTPATIENT)
Dept: INTERNAL MEDICINE CLINIC | Age: 63
End: 2018-09-17

## 2018-09-17 VITALS
RESPIRATION RATE: 12 BRPM | HEIGHT: 64 IN | HEART RATE: 71 BPM | DIASTOLIC BLOOD PRESSURE: 80 MMHG | OXYGEN SATURATION: 98 % | TEMPERATURE: 98.4 F | SYSTOLIC BLOOD PRESSURE: 120 MMHG | BODY MASS INDEX: 24.41 KG/M2 | WEIGHT: 143 LBS

## 2018-09-17 DIAGNOSIS — K44.9 HIATAL HERNIA: Primary | ICD-10-CM

## 2018-09-17 DIAGNOSIS — K21.9 GASTROESOPHAGEAL REFLUX DISEASE WITHOUT ESOPHAGITIS: ICD-10-CM

## 2018-09-17 DIAGNOSIS — Z23 ENCOUNTER FOR IMMUNIZATION: ICD-10-CM

## 2018-09-17 RX ORDER — PANTOPRAZOLE SODIUM 40 MG/1
40 TABLET, DELAYED RELEASE ORAL
Qty: 30 TAB | Refills: 1 | COMMUNITY
Start: 2018-09-17 | End: 2018-11-28

## 2018-09-17 NOTE — PROGRESS NOTES
HPI:  Fariha Noel is a 61y.o. year old female who is here for a several month history of epigastric discomfort. She describes it as being a burning discomfort. Most notable when she is driving her car. At times it seems to be made worse by meals. No vomiting. No melena. No change in bowel habits. No fevers or chills. No dysphasia. She does have some occasional difficulty swallowing large pills. Of note she had a CT scan of the abdomen done earlier this year for hematuria that did reveal small hiatal hernia. She was given Protonix by the cardiologist but has not taken that. Past Medical History:   Diagnosis Date    Fracture 2016    Right foot fx (pt turned foot while stepping off curb)    GERD (gastroesophageal reflux disease)     HX    Headache(784.0) 12/17/2009    Mixed hyperlipidemia 12/17/2009    Rosacea 2017       Past Surgical History:   Procedure Laterality Date    HX BLADDER SUSPENSION  5/2014    HX DILATION AND CURETTAGE      HX TUBAL LIGATION      TITA US BX BREAST LT 1ST LESION W/CLIP AND SPECIMEN Left 12/09/2016    benign       Prior to Admission medications    Medication Sig Start Date End Date Taking? Authorizing Provider   pantoprazole (PROTONIX) 40 mg tablet Take 1 Tab by mouth Daily (before breakfast). 9/17/18  Yes Karyna Chavez III, MD   nebivolol (BYSTOLIC) 10 mg tablet Take 1 Tab by mouth daily. 5/21/18  Yes Leonel Vo MD   clobetasol (TEMOVATE) 0.05 % topical cream APPLY TOPICALLY TO AFFECTED AREA TWICE DAILY 11/14/17  Yes Historical Provider   doxycycline (VIBRA-TABS) 100 mg tablet TAKE 1 TABLET ONCE DAILY 10/29/17  Yes Historical Provider   b complex vitamins (B COMPLEX 1) tablet Take 1 Tab by mouth daily. 6/2/15  Yes Karyna Chavez III, MD   cholecalciferol (VITAMIN D3) 1,000 unit tablet Take 1,000 Units by mouth daily.    Yes Historical Provider       Social History     Social History    Marital status:      Spouse name: N/A    Number of children: N/A    Years of education: N/A     Occupational History    Not on file. Social History Main Topics    Smoking status: Never Smoker    Smokeless tobacco: Never Used    Alcohol use 1.0 oz/week     2 Glasses of wine per week      Comment: WEEKLY    Drug use: No    Sexual activity: Yes     Partners: Male     Other Topics Concern    Not on file     Social History Narrative          ROS  Per HPI    Visit Vitals    /80    Pulse 71    Temp 98.4 °F (36.9 °C) (Oral)    Resp 12    Ht 5' 4\" (1.626 m)    Wt 143 lb (64.9 kg)    SpO2 98%    BMI 24.55 kg/m2         Physical Exam   Physical Examination: General appearance - alert, well appearing, and in no distress  Lymphatics - no palpable lymphadenopathy, no hepatosplenomegaly  Chest - clear to auscultation, no wheezes, rales or rhonchi, symmetric air entry  Heart - normal rate, regular rhythm, normal S1, S2, no murmurs, rubs, clicks or gallops  Abdomen - tenderness noted the epigastrium mildly. no rebound tenderness noted  bowel sounds normal      Assessment/Plan:  Diagnoses and all orders for this visit:    1. Hiatal hernia -likely with some degree of reflux. Also she has been taking aspirin and doxycycline both of which could be irritating. At this point she will take the Protonix she was given by Dr. Coletta Krabbe. She will let me know if her symptoms do not improve. If no improvement would consider an upper endoscopy. 2. Gastroesophageal reflux disease without esophagitis  Did discuss the shingles vaccine as well and she will get one when available. Follow-up Disposition:as needed       Advised her to call back or return to office if symptoms worsen/change/persist.  Discussed expected course/resolution/complications of diagnosis in detail with patient. Medication risks/benefits/costs/interactions/alternatives discussed with patient. She was given an after visit summary which includes diagnoses, current medications, & vitals.   She expressed understanding with the diagnosis and plan.

## 2018-11-28 ENCOUNTER — OFFICE VISIT (OUTPATIENT)
Dept: CARDIOLOGY CLINIC | Age: 63
End: 2018-11-28

## 2018-11-28 VITALS
WEIGHT: 148 LBS | HEART RATE: 70 BPM | SYSTOLIC BLOOD PRESSURE: 120 MMHG | DIASTOLIC BLOOD PRESSURE: 80 MMHG | RESPIRATION RATE: 16 BRPM | HEIGHT: 64 IN | BODY MASS INDEX: 25.27 KG/M2

## 2018-11-28 DIAGNOSIS — K21.9 GASTROESOPHAGEAL REFLUX DISEASE, ESOPHAGITIS PRESENCE NOT SPECIFIED: ICD-10-CM

## 2018-11-28 DIAGNOSIS — R00.2 PALPITATIONS: ICD-10-CM

## 2018-11-28 DIAGNOSIS — I10 ESSENTIAL HYPERTENSION: Primary | ICD-10-CM

## 2018-11-28 RX ORDER — ASPIRIN 81 MG/1
TABLET ORAL DAILY
COMMUNITY
End: 2019-01-23 | Stop reason: ALTCHOICE

## 2018-11-28 RX ORDER — PANTOPRAZOLE SODIUM 40 MG/1
40 TABLET, DELAYED RELEASE ORAL DAILY
Qty: 30 TAB | Refills: 1 | Status: SHIPPED | OUTPATIENT
Start: 2018-11-28 | End: 2020-01-24 | Stop reason: ALTCHOICE

## 2018-11-28 NOTE — PROGRESS NOTES
[facility] Debora Kraustz  [phone]    HPI: Montana Barker, a 61y.o. year-old who presents for follow up regarding her HTN. Checks BP at home once/week and it is around 140/80  /79 last night, not many that are higher than a systolic of [id number]  No headaches  No dizziness or syncope  Has occasional epigastric pain which feels like a burning, it is not exertional, not related to eating, helped by protonix in the past but she hasn't taken it for 2-3 months  Asking if she should see GI for evaluation, doesn't happen every day  No chest pain   Only has dyspnea with exertion after walking up and down 3 flights of stairs a few times  (they are building a house in [facility] so she can go up and down 3 flights of stairs a lot when they are there)  Denies any PND or orthopnea  Had palpitations once last week, lasted a minute or so, no associated symptoms  Advised her to advise us if palpitations continue to occur  Walks 30 minutes/day 2-3 times/week without any symptoms  Tingling in right hand got much better after carpal tunnel surgery  No LE edema     Assessment/Plan:  1. Dyspnea - mild, not progressive   2. HTN- well controlled on bystolic 48RG at bedtime, limiting sodium intake  -follow up in the office in 1 year   3. GERD - not currently on PPI and having epigastric pain, advised her to resume Protonix 40mg daily and told her to make an appointment for GI evaluation if her epigastric pain does not improve after 1 month of taking Protonix  -hx of hiatal hernia and extensive diverticulosis on CT in the past   4. Chest discomfort - none at this time   5.  Palpitations - one episode, continue to monitor, K 3.5 in , TSH ok 98/95, on bystolic   -advised her to call the office if episodes continue to occur, will proceed with loop monitor to assess if episodes continue     Ca score zero   Stress Echo  - walked 9 min, 108% PMHR, no ischemia     Fam Hx: Mom with HTN, smoker,  of MI at 78, had CVA in her 76s, brothers with HTN  Soc hx: no tob use, drinks 1-2 alcoholic drinks/week    She  has a past medical history of Fracture, GERD (gastroesophageal reflux disease), Headache(784.0), Mixed hyperlipidemia, and Rosacea. Cardiovascular ROS: positive for palpitations   Respiratory ROS: no increase in RODGERS   Neurological ROS: no TIA or stroke symptoms  All other systems negative except as above. PE  Vitals:    11/28/18 0925   BP: 120/80   Pulse: 70   Resp: 16   Weight: 148 lb (67.1 kg)   Height: 5' 4\" (1.626 m)    Body mass index is 25.4 kg/m².    General appearance - alert, well appearing, and in no distress  Mental status - affect appropriate to mood  Eyes - sclera anicteric, moist mucous membranes  Neck - supple, no carotid bruits   Lymphatics - not assessed   Chest - clear to auscultation, no wheezes, rales or rhonchi  Heart - normal rate, regular rhythm, normal S1, S2, no murmurs, rubs, clicks or gallops  Abdomen - soft, nontender, nondistended, no masses or organomegaly  Back exam - full range of motion, no tenderness  Neurological - cranial nerves II through XII grossly intact, no focal deficit  Musculoskeletal - no muscular tenderness noted, normal strength  Extremities - peripheral pulses normal, no pedal edema  Skin - normal coloration  no rashes    Recent Labs:  Lab Results   Component Value Date/Time    Cholesterol, total 169 12/05/2017 12:00 AM    HDL Cholesterol 46 12/05/2017 12:00 AM    LDL, calculated 107 (H) 12/05/2017 12:00 AM    Triglyceride 80 12/05/2017 12:00 AM    CHOL/HDL Ratio 3.9 12/18/2009 07:45 AM     Lab Results   Component Value Date/Time    Creatinine 0.80 05/06/2018 01:37 AM     Lab Results   Component Value Date/Time    BUN 20 05/06/2018 01:37 AM     Lab Results   Component Value Date/Time    Potassium 3.5 05/06/2018 01:37 AM     No results found for: HBA1C, HGBE8, FLR9KUNV, FZV8ZCHL  Lab Results   Component Value Date/Time    HGB 12.3 05/06/2018 01:37 AM     Lab Results   Component Value Date/Time    PLATELET 609 65/06/8049 01:37 AM       Reviewed:  Past Medical History:   Diagnosis Date    Fracture 2016    Right foot fx (pt turned foot while stepping off curb)    GERD (gastroesophageal reflux disease)     HX    Headache(784.0) 12/17/2009    Mixed hyperlipidemia 12/17/2009    Rosacea 2017     Social History     Tobacco Use   Smoking Status Never Smoker   Smokeless Tobacco Never Used     Social History     Substance and Sexual Activity   Alcohol Use Yes    Alcohol/week: 1.0 oz    Types: 2 Glasses of wine per week    Frequency: Monthly or less    Drinks per session: 1 or 2    Binge frequency: Never    Comment: WEEKLY     Allergies   Allergen Reactions    Morphine Nausea Only    Zithromax [Azithromycin] Nausea Only       Current Outpatient Medications   Medication Sig    aspirin delayed-release 81 mg tablet Take  by mouth daily.  pantoprazole (PROTONIX) 40 mg tablet Take 1 Tab by mouth daily.  nebivolol (BYSTOLIC) 10 mg tablet Take 1 Tab by mouth daily.  clobetasol (TEMOVATE) 0.05 % topical cream APPLY TOPICALLY TO AFFECTED AREA TWICE DAILY    doxycycline (VIBRA-TABS) 100 mg tablet TAKE 1 TABLET ONCE DAILY    b complex vitamins (B COMPLEX 1) tablet Take 1 Tab by mouth daily.  cholecalciferol (VITAMIN D3) 1,000 unit tablet Take 1,000 Units by mouth daily. No current facility-administered medications for this visit.         MD Latisha Christensen [facility] [place]  [address], [place] [address]

## 2018-11-28 NOTE — PATIENT INSTRUCTIONS
Please begin Protonix 40mg daily     If your epigastric pain does not improve after 1 month of taking Protonix please see gastroenterology for evaluation     You do not have to take Aspirin at this point   If you prefer to take it due to your family history of strokes then you should just take it every other day to lessen the effects of stomach irritation.

## 2018-12-13 ENCOUNTER — HOSPITAL ENCOUNTER (OUTPATIENT)
Dept: MAMMOGRAPHY | Age: 63
Discharge: HOME | End: 2018-12-13
Attending: INTERNAL MEDICINE
Payer: COMMERCIAL

## 2018-12-13 DIAGNOSIS — Z12.39 SCREENING BREAST EXAMINATION: ICD-10-CM

## 2018-12-13 PROCEDURE — 77067 SCR MAMMO BI INCL CAD: CPT

## 2019-01-23 ENCOUNTER — OFFICE VISIT (OUTPATIENT)
Dept: INTERNAL MEDICINE CLINIC | Age: 64
End: 2019-01-23

## 2019-01-23 VITALS
HEART RATE: 69 BPM | RESPIRATION RATE: 14 BRPM | HEIGHT: 64 IN | OXYGEN SATURATION: 98 % | SYSTOLIC BLOOD PRESSURE: 137 MMHG | TEMPERATURE: 98 F | BODY MASS INDEX: 25.27 KG/M2 | DIASTOLIC BLOOD PRESSURE: 87 MMHG | WEIGHT: 148 LBS

## 2019-01-23 DIAGNOSIS — Z00.00 ROUTINE GENERAL MEDICAL EXAMINATION AT A HEALTH CARE FACILITY: Primary | ICD-10-CM

## 2019-01-24 NOTE — PROGRESS NOTES
Subjective:  
61 y.o. female for Well Woman Check. Her gyne and breast care is done elsewhere by her Ob-Gyne physician. Patient Active Problem List  
 Diagnosis Date Noted  Cataract, nuclear sclerotic, both eyes 06/29/2018  Chest pain at rest 05/06/2018  Gastroesophageal reflux disease without esophagitis 05/06/2018  Essential hypertension 05/06/2018  Intermittent palpitations 05/06/2018  Pelvic relaxation 05/30/2014  Mixed hyperlipidemia 12/17/2009  
 Headache(784.0) 12/17/2009 Current Outpatient Medications Medication Sig Dispense Refill  varicella-zoster recombinant, PF, (SHINGRIX, PF,) 50 mcg/0.5 mL susr injection 0.5 mL by IntraMUSCular route once for 1 dose. 0.5 mL 1  
 pantoprazole (PROTONIX) 40 mg tablet Take 1 Tab by mouth daily. 30 Tab 1  
 nebivolol (BYSTOLIC) 10 mg tablet Take 1 Tab by mouth daily. 90 Tab 3  clobetasol (TEMOVATE) 0.05 % topical cream APPLY TOPICALLY TO AFFECTED AREA TWICE DAILY  6  
 doxycycline (VIBRA-TABS) 100 mg tablet TAKE 1 TABLET ONCE DAILY  5  
 b complex vitamins (B COMPLEX 1) tablet Take 1 Tab by mouth daily.  cholecalciferol (VITAMIN D3) 1,000 unit tablet Take 1,000 Units by mouth daily. Allergies Allergen Reactions  Morphine Nausea Only  Zithromax [Azithromycin] Nausea Only Past Medical History:  
Diagnosis Date  Fracture 2016 Right foot fx (pt turned foot while stepping off curb)  GERD (gastroesophageal reflux disease) HX  
 Headache(784.0) 12/17/2009  Mixed hyperlipidemia 12/17/2009  Rosacea 2017 Past Surgical History:  
Procedure Laterality Date  HX BLADDER SUSPENSION  5/2014  HX DILATION AND CURETTAGE    
 HX TUBAL LIGATION    
 TITA US BX BREAST LT 1ST LESION W/CLIP AND SPECIMEN Left 12/09/2016  
 benign Family History Problem Relation Age of Onset  Heart Disease Mother MI  
 Cancer Father Unknown primary  Anesth Problems Neg Hx Social History Tobacco Use  Smoking status: Never Smoker  Smokeless tobacco: Never Used Substance Use Topics  Alcohol use: Yes Alcohol/week: 1.0 oz Types: 2 Glasses of wine per week Frequency: Monthly or less Drinks per session: 1 or 2 Binge frequency: Never Comment: WEEKLY Lab Results Component Value Date/Time WBC 9.2 05/06/2018 01:37 AM  
 HGB 12.3 05/06/2018 01:37 AM  
 HCT 37.3 05/06/2018 01:37 AM  
 PLATELET 827 84/99/4580 01:37 AM  
 MCV 93.3 05/06/2018 01:37 AM  
 
Lab Results Component Value Date/Time Cholesterol, total 169 12/05/2017 12:00 AM  
 HDL Cholesterol 46 12/05/2017 12:00 AM  
 LDL, calculated 107 (H) 12/05/2017 12:00 AM  
 Triglyceride 80 12/05/2017 12:00 AM  
 CHOL/HDL Ratio 3.9 12/18/2009 07:45 AM  
 
Lab Results Component Value Date/Time ALT (SGPT) 19 05/06/2018 01:37 AM  
 AST (SGOT) 12 (L) 05/06/2018 01:37 AM  
 Alk. phosphatase 79 05/06/2018 01:37 AM  
 Bilirubin, total 0.8 05/06/2018 01:37 AM  
 Albumin 3.7 05/06/2018 01:37 AM  
 Protein, total 6.4 05/06/2018 01:37 AM  
 PLATELET 434 51/25/8779 01:37 AM  
 
Lab Results Component Value Date/Time GFR est non-AA >60 05/06/2018 01:37 AM  
 GFR est AA >60 05/06/2018 01:37 AM  
 Creatinine 0.80 05/06/2018 01:37 AM  
 BUN 20 05/06/2018 01:37 AM  
 Sodium 144 05/06/2018 01:37 AM  
 Potassium 3.5 05/06/2018 01:37 AM  
 Chloride 108 05/06/2018 01:37 AM  
 CO2 29 05/06/2018 01:37 AM  
 Magnesium 2.3 05/06/2018 07:38 AM  
 Phosphorus 3.5 05/06/2018 07:38 AM  
 
Lab Results Component Value Date/Time TSH 2.42 05/06/2018 07:38 AM  
 T4, Total 9.1 12/22/2014 02:31 PM  
  
Lab Results Component Value Date/Time Glucose 100 05/06/2018 01:37 AM  
   
 
Specific concerns today: No issues. . 
 
Review of Systems A comprehensive review of systems was negative.  
 
Objective:  
Blood pressure 137/87, pulse 69, temperature 98 °F (36.7 °C), temperature source Oral, resp. rate 14, height 5' 4\" (1.626 m), weight 148 lb (67.1 kg), SpO2 98 %. Physical Examination:  
General appearance - alert, well appearing, and in no distress Eyes - pupils equal and reactive, extraocular eye movements intact Ears - bilateral TM's and external ear canals normal 
Nose - normal and patent, no erythema, discharge or polyps Mouth - mucous membranes moist, pharynx normal without lesions Neck - supple, no significant adenopathy Lymphatics - no palpable lymphadenopathy, no hepatosplenomegaly Chest - clear to auscultation, no wheezes, rales or rhonchi, symmetric air entry Heart - normal rate, regular rhythm, normal S1, S2, no murmurs, rubs, clicks or gallops Abdomen - soft, nontender, nondistended, no masses or organomegaly Back exam - full range of motion, no tenderness, palpable spasm or pain on motion Neurological - alert, oriented, normal speech, no focal findings or movement disorder noted Musculoskeletal - no joint tenderness, deformity or swelling Extremities - peripheral pulses normal, no pedal edema, no clubbing or cyanosis Assessment/Plan:  
 
continue present plan, routine labs ordered Diagnoses and all orders for this visit: 1. Routine general medical examination at a health care facility 
-     CBC WITH AUTOMATED DIFF 
-     METABOLIC PANEL, COMPREHENSIVE 
-     LIPID PANEL 
-     TSH RFX ON ABNORMAL TO FREE T4 
-     varicella-zoster recombinant, PF, (SHINGRIX, PF,) 50 mcg/0.5 mL susr injection; 0.5 mL by IntraMUSCular route once for 1 dose. 2.  Hypertensionblood pressure well controlled. Continue meds that she is tolerating. 3.  Hyperlipidemiarecheck labs for stability. 4.  Reflux symptoms resolved with Protonix. Follow-up Disposition: 
Return in about 1 year (around 1/23/2020).   
Advised her to call back or return to office if symptoms worsen/change/persist. 
Discussed expected course/resolution/complications of diagnosis in detail with patient. Medication risks/benefits/costs/interactions/alternatives discussed with patient. She was given an after visit summary which includes diagnoses, current medications, & vitals. She expressed understanding with the diagnosis and plan.

## 2019-01-26 LAB
ALBUMIN SERPL-MCNC: 4.3 G/DL (ref 3.6–4.8)
ALBUMIN/GLOB SERPL: 2 {RATIO} (ref 1.2–2.2)
ALP SERPL-CCNC: 91 IU/L (ref 39–117)
ALT SERPL-CCNC: 25 IU/L (ref 0–32)
AST SERPL-CCNC: 34 IU/L (ref 0–40)
BASOPHILS # BLD AUTO: 0 X10E3/UL (ref 0–0.2)
BASOPHILS NFR BLD AUTO: 0 %
BILIRUB SERPL-MCNC: 1.3 MG/DL (ref 0–1.2)
BUN SERPL-MCNC: 13 MG/DL (ref 8–27)
BUN/CREAT SERPL: 16 (ref 12–28)
CALCIUM SERPL-MCNC: 9.2 MG/DL (ref 8.7–10.3)
CHLORIDE SERPL-SCNC: 102 MMOL/L (ref 96–106)
CHOLEST SERPL-MCNC: 183 MG/DL (ref 100–199)
CO2 SERPL-SCNC: 26 MMOL/L (ref 20–29)
CREAT SERPL-MCNC: 0.79 MG/DL (ref 0.57–1)
EOSINOPHIL # BLD AUTO: 0.1 X10E3/UL (ref 0–0.4)
EOSINOPHIL NFR BLD AUTO: 1 %
ERYTHROCYTE [DISTWIDTH] IN BLOOD BY AUTOMATED COUNT: 13.9 % (ref 12.3–15.4)
GLOBULIN SER CALC-MCNC: 2.2 G/DL (ref 1.5–4.5)
GLUCOSE SERPL-MCNC: 101 MG/DL (ref 65–99)
HCT VFR BLD AUTO: 41.8 % (ref 34–46.6)
HDLC SERPL-MCNC: 43 MG/DL
HGB BLD-MCNC: 13.7 G/DL (ref 11.1–15.9)
IMM GRANULOCYTES # BLD AUTO: 0 X10E3/UL (ref 0–0.1)
IMM GRANULOCYTES NFR BLD AUTO: 0 %
LDLC SERPL CALC-MCNC: 116 MG/DL (ref 0–99)
LYMPHOCYTES # BLD AUTO: 1.1 X10E3/UL (ref 0.7–3.1)
LYMPHOCYTES NFR BLD AUTO: 12 %
MCH RBC QN AUTO: 30.3 PG (ref 26.6–33)
MCHC RBC AUTO-ENTMCNC: 32.8 G/DL (ref 31.5–35.7)
MCV RBC AUTO: 93 FL (ref 79–97)
MONOCYTES # BLD AUTO: 0.6 X10E3/UL (ref 0.1–0.9)
MONOCYTES NFR BLD AUTO: 7 %
NEUTROPHILS # BLD AUTO: 6.8 X10E3/UL (ref 1.4–7)
NEUTROPHILS NFR BLD AUTO: 80 %
PLATELET # BLD AUTO: 192 X10E3/UL (ref 150–379)
POTASSIUM SERPL-SCNC: 4 MMOL/L (ref 3.5–5.2)
PROT SERPL-MCNC: 6.5 G/DL (ref 6–8.5)
RBC # BLD AUTO: 4.52 X10E6/UL (ref 3.77–5.28)
SODIUM SERPL-SCNC: 142 MMOL/L (ref 134–144)
TRIGL SERPL-MCNC: 120 MG/DL (ref 0–149)
TSH SERPL DL<=0.005 MIU/L-ACNC: 1.53 UIU/ML (ref 0.45–4.5)
VLDLC SERPL CALC-MCNC: 24 MG/DL (ref 5–40)
WBC # BLD AUTO: 8.6 X10E3/UL (ref 3.4–10.8)

## 2019-05-30 RX ORDER — NEBIVOLOL HYDROCHLORIDE 10 MG/1
TABLET ORAL
Qty: 90 TAB | Refills: 3 | Status: SHIPPED | OUTPATIENT
Start: 2019-05-30 | End: 2020-05-02

## 2019-11-25 ENCOUNTER — HOSPITAL ENCOUNTER (OUTPATIENT)
Dept: GENERAL RADIOLOGY | Age: 64
Discharge: HOME | End: 2019-11-25
Payer: COMMERCIAL

## 2019-11-25 ENCOUNTER — OFFICE VISIT (OUTPATIENT)
Dept: CARDIOLOGY CLINIC | Age: 64
End: 2019-11-25

## 2019-11-25 VITALS
RESPIRATION RATE: 16 BRPM | WEIGHT: 151.8 LBS | BODY MASS INDEX: 25.92 KG/M2 | OXYGEN SATURATION: 96 % | HEART RATE: 103 BPM | SYSTOLIC BLOOD PRESSURE: 132 MMHG | HEIGHT: 64 IN | DIASTOLIC BLOOD PRESSURE: 86 MMHG

## 2019-11-25 DIAGNOSIS — R05.9 COUGH: ICD-10-CM

## 2019-11-25 DIAGNOSIS — I10 ESSENTIAL HYPERTENSION: Primary | ICD-10-CM

## 2019-11-25 DIAGNOSIS — R09.89 RHONCHI AT RIGHT LUNG BASE: ICD-10-CM

## 2019-11-25 DIAGNOSIS — R00.2 PALPITATIONS: ICD-10-CM

## 2019-11-25 PROCEDURE — 71046 X-RAY EXAM CHEST 2 VIEWS: CPT

## 2019-11-25 RX ORDER — DOXYCYCLINE 100 MG/1
100 TABLET ORAL 2 TIMES DAILY
Qty: 14 TAB | Refills: 0 | Status: SHIPPED | OUTPATIENT
Start: 2019-11-25 | End: 2019-12-02

## 2019-11-25 NOTE — PROGRESS NOTES
PRICILLA [employer]: [employer]  [phone]    HPI: Jb Vazquez, a 59y.o. year-old who presents for follow up regarding her HTN. /80 at home, has rare BP readings in the 140/90 range  No headaches  No dizziness or syncope  No chest pain  No LE edema   Has had a cough for 2 weeks, no fevers or chills, coughs overnight   Feeling more tired in the last 2 weeks as well   Cough is not very productive, took some mucinex but didn't really notice a big difference   Feels like cough is getting better, not coughing as hard or as long   Not having any more epigastric pain  No dyspnea with exertion, no PND    Just finished building a house in [facility] with 3 stories   Not having any palpitations recently   Having labs again at visit with Dr. Princess Blevins   She is not exercising much   Tingling in right hand got much better after carpal tunnel surgery    Assessment/Plan:  1. Dyspnea - none currently    2. HTN- well controlled on bystolic 81BB at bedtime, limiting sodium intake  -follow up in the office in 1 year   3. GERD - on PPI  -hx of hiatal hernia and extensive diverticulosis on CT in the past   4. Chest discomfort - none currently    5. Palpitations - well controlled on bystolic  6. Cough - with rhonchi on exam in [place], will send her for a CXR to evaluate  -will Rx doxycycline 100mg BID x 7 days for probable pneumonia   -advised her to follow up with Dr. Princess Blevins in 2-3 weeks if she is not feeling better     Ca score zero   Stress Echo  - walked 9 min, 108% PMHR, no ischemia     Fam Hx: Mom with HTN, smoker,  of MI at 78, had CVA in her 76s, brothers with HTN  Soc hx: no tob use, drinks 1-2 alcoholic drinks/week    She  has a past medical history of Fracture (2016), GERD (gastroesophageal reflux disease), Headache(784.0) (2009), Mixed hyperlipidemia (2009), and Rosacea (2017).     Cardiovascular ROS: no chest pain or palpitations   Respiratory ROS: no dyspnea with exertion, positive for cough    Neurological ROS: no TIA or stroke symptoms  All other systems negative except as above. PE  Vitals:    11/25/19 1323   BP: 132/86   Pulse: (!) 103   Resp: 16   SpO2: 96%   Weight: 151 lb 12.8 oz (68.9 kg)   Height: 5' 4\" (1.626 m)    Body mass index is 26.06 kg/m².    General appearance - alert, well appearing, and in no distress  Mental status - affect appropriate to mood  Eyes - sclera anicteric, moist mucous membranes  Neck - supple, no carotid bruits   Lymphatics - not assessed   Chest - right base with rhonchi, left lung clear   Heart - normal rate, regular rhythm, normal S1, S2, no murmurs, rubs, clicks or gallops  Abdomen - soft, nontender, nondistended  Back exam - full range of motion, no tenderness  Neurological - cranial nerves II through XII grossly intact, no focal deficit  Musculoskeletal - no muscular tenderness noted, normal strength  Extremities - peripheral pulses normal, no pedal edema  Skin - normal coloration  no rashes    12 lead ECG: NSR     Recent Labs:  Lab Results   Component Value Date/Time    Cholesterol, total 183 01/25/2019 08:03 AM    HDL Cholesterol 43 01/25/2019 08:03 AM    LDL, calculated 116 (H) 01/25/2019 08:03 AM    Triglyceride 120 01/25/2019 08:03 AM    CHOL/HDL Ratio 3.9 12/18/2009 07:45 AM     Lab Results   Component Value Date/Time    Creatinine 0.79 01/25/2019 08:03 AM     Lab Results   Component Value Date/Time    BUN 13 01/25/2019 08:03 AM     Lab Results   Component Value Date/Time    Potassium 4.0 01/25/2019 08:03 AM     No results found for: HBA1C, HGBE8, IWY4OFZG, DPO4SFZF  Lab Results   Component Value Date/Time    HGB 13.7 01/25/2019 08:03 AM     Lab Results   Component Value Date/Time    PLATELET 197 20/80/1436 08:03 AM       Reviewed:  Past Medical History:   Diagnosis Date    Fracture 2016    Right foot fx (pt turned foot while stepping off curb)    GERD (gastroesophageal reflux disease)     HX    Headache(784.0) 12/17/2009    Mixed hyperlipidemia 12/17/2009    Rosacea 2017     Social History     Tobacco Use   Smoking Status Never Smoker   Smokeless Tobacco Never Used     Social History     Substance and Sexual Activity   Alcohol Use Yes    Alcohol/week: 1.7 standard drinks    Types: 2 Glasses of wine per week    Frequency: Monthly or less    Drinks per session: 1 or 2    Binge frequency: Never    Comment: WEEKLY     Allergies   Allergen Reactions    Morphine Nausea Only    Zithromax [Azithromycin] Nausea Only       Current Outpatient Medications   Medication Sig    doxycycline (ADOXA) 100 mg tablet Take 1 Tab by mouth two (2) times a day for 7 days.  BYSTOLIC 10 mg tablet TAKE 1 TABLET BY MOUTH DAILY    pantoprazole (PROTONIX) 40 mg tablet Take 1 Tab by mouth daily. (Patient taking differently: Take 40 mg by mouth daily. Prn)    clobetasol (TEMOVATE) 0.05 % topical cream prn     No current facility-administered medications for this visit.         Shira Sandoval MD  [facility] [place]  [address] [facility] [address]  [address]

## 2019-11-25 NOTE — PATIENT INSTRUCTIONS
Please have chest xray done today at imaging center Please begin doxycycline 100mg twice daily for 7 days, take with food Please follow up with Dr. Leydi Marquez if you don't feel better in 2-3 weeks

## 2019-11-26 ENCOUNTER — TELEPHONE (OUTPATIENT)
Dept: CARDIOLOGY CLINIC | Age: 64
End: 2019-11-26

## 2019-11-26 NOTE — TELEPHONE ENCOUNTER
----- Message from Sydnie Rojo NP sent at 11/25/2019  8:26 PM EST -----  No evidence of pneumonia but continue antibiotic      Above test results given to patient. She will continue medication.   2 pt identifiers used

## 2019-12-16 ENCOUNTER — HOSPITAL ENCOUNTER (OUTPATIENT)
Dept: MAMMOGRAPHY | Age: 64
Discharge: HOME | End: 2019-12-16
Attending: INTERNAL MEDICINE
Payer: COMMERCIAL

## 2019-12-16 DIAGNOSIS — Z12.31 VISIT FOR SCREENING MAMMOGRAM: ICD-10-CM

## 2019-12-16 PROCEDURE — 77067 SCR MAMMO BI INCL CAD: CPT

## 2020-01-24 ENCOUNTER — OFFICE VISIT (OUTPATIENT)
Dept: INTERNAL MEDICINE CLINIC | Age: 65
End: 2020-01-24

## 2020-01-24 VITALS
RESPIRATION RATE: 14 BRPM | WEIGHT: 151 LBS | DIASTOLIC BLOOD PRESSURE: 87 MMHG | SYSTOLIC BLOOD PRESSURE: 130 MMHG | HEART RATE: 72 BPM | HEIGHT: 64 IN | BODY MASS INDEX: 25.78 KG/M2 | TEMPERATURE: 97.8 F | OXYGEN SATURATION: 95 %

## 2020-01-24 DIAGNOSIS — Z00.00 ROUTINE GENERAL MEDICAL EXAMINATION AT A HEALTH CARE FACILITY: Primary | ICD-10-CM

## 2020-01-24 DIAGNOSIS — R35.0 URINARY FREQUENCY: ICD-10-CM

## 2020-01-24 LAB
BILIRUB UR QL STRIP: NEGATIVE
GLUCOSE UR-MCNC: NEGATIVE MG/DL
KETONES P FAST UR STRIP-MCNC: NEGATIVE MG/DL
PH UR STRIP: 7 [PH] (ref 4.6–8)
PROT UR QL STRIP: NEGATIVE
SP GR UR STRIP: 1.01 (ref 1–1.03)
UA UROBILINOGEN AMB POC: ABNORMAL (ref 0.2–1)
URINALYSIS CLARITY POC: ABNORMAL
URINALYSIS COLOR POC: YELLOW
URINE BLOOD POC: NEGATIVE
URINE LEUKOCYTES POC: ABNORMAL
URINE NITRITES POC: POSITIVE

## 2020-01-24 RX ORDER — DOXYCYCLINE 100 MG/1
100 CAPSULE ORAL 2 TIMES DAILY
COMMUNITY
End: 2020-10-26 | Stop reason: ALTCHOICE

## 2020-01-24 RX ORDER — LANOLIN ALCOHOL/MO/W.PET/CERES
400 CREAM (GRAM) TOPICAL DAILY
COMMUNITY
Start: 2020-01-24 | End: 2020-10-26 | Stop reason: ALTCHOICE

## 2020-01-24 NOTE — PROGRESS NOTES
Subjective:   59 y.o. female for Well Woman Check. Her gyne and breast care is done elsewhere by her Ob-Gyne physician. Patient Active Problem List    Diagnosis Date Noted    Cataract, nuclear sclerotic, both eyes 06/29/2018    Chest pain at rest 05/06/2018    Gastroesophageal reflux disease without esophagitis 05/06/2018    Essential hypertension 05/06/2018    Intermittent palpitations 05/06/2018    Pelvic relaxation 05/30/2014    Mixed hyperlipidemia 12/17/2009    Headache(784.0) 12/17/2009     Current Outpatient Medications   Medication Sig Dispense Refill    doxycycline (MONODOX) 100 mg capsule Take 100 mg by mouth two (2) times a day.  magnesium oxide (MAG-OX) 400 mg tablet Take 1 Tab by mouth daily.  BYSTOLIC 10 mg tablet TAKE 1 TABLET BY MOUTH DAILY 90 Tab 3    clobetasol (TEMOVATE) 0.05 % topical cream prn  6     Allergies   Allergen Reactions    Morphine Nausea Only    Zithromax [Azithromycin] Nausea Only     Past Medical History:   Diagnosis Date    Fracture 2016    Right foot fx (pt turned foot while stepping off curb)    GERD (gastroesophageal reflux disease)     HX    Headache(784.0) 12/17/2009    Mixed hyperlipidemia 12/17/2009    Rosacea 2017     Past Surgical History:   Procedure Laterality Date    HX BLADDER SUSPENSION  5/2014    HX DILATION AND CURETTAGE      HX TUBAL LIGATION      TITA US BX BREAST LT 1ST LESION W/CLIP AND SPECIMEN Left 12/09/2016    benign     Family History   Problem Relation Age of Onset    Heart Disease Mother         MI   [facility] Father         Unknown primary   [facility] Psoriasis Daughter     Anesth Problems Neg Hx      Social History     Tobacco Use    Smoking status: Never Smoker    Smokeless tobacco: Never Used   Substance Use Topics    Alcohol use:  Yes     Alcohol/week: 1.7 standard drinks     Types: 2 Glasses of wine per week     Frequency: Monthly or less     Drinks per session: 1 or 2     Binge frequency: Never     Comment: WEEKLY Lab Results   Component Value Date/Time    WBC 8.6 01/25/2019 08:03 AM    HGB 13.7 01/25/2019 08:03 AM    HCT 41.8 01/25/2019 08:03 AM    PLATELET 404 03/10/1991 08:03 AM    MCV 93 01/25/2019 08:03 AM     Lab Results   Component Value Date/Time    Cholesterol, total 183 01/25/2019 08:03 AM    HDL Cholesterol 43 01/25/2019 08:03 AM    LDL, calculated 116 (H) 01/25/2019 08:03 AM    Triglyceride 120 01/25/2019 08:03 AM    CHOL/HDL Ratio 3.9 12/18/2009 07:45 AM     Lab Results   Component Value Date/Time    ALT (SGPT) 25 01/25/2019 08:03 AM    AST (SGOT) 34 01/25/2019 08:03 AM    Alk. phosphatase 91 01/25/2019 08:03 AM    Bilirubin, total 1.3 (H) 01/25/2019 08:03 AM    Albumin 4.3 01/25/2019 08:03 AM    Protein, total 6.5 01/25/2019 08:03 AM    PLATELET 256 38/12/4486 08:03 AM     Lab Results   Component Value Date/Time    GFR est non-AA 80 01/25/2019 08:03 AM    GFR est AA 92 01/25/2019 08:03 AM    Creatinine 0.79 01/25/2019 08:03 AM    BUN 13 01/25/2019 08:03 AM    Sodium 142 01/25/2019 08:03 AM    Potassium 4.0 01/25/2019 08:03 AM    Chloride 102 01/25/2019 08:03 AM    CO2 26 01/25/2019 08:03 AM    Magnesium 2.3 05/06/2018 07:38 AM    Phosphorus 3.5 05/06/2018 07:38 AM     Lab Results   Component Value Date/Time    TSH 1.530 01/25/2019 08:03 AM    TSH 2.42 05/06/2018 07:38 AM    T4, Total 9.1 12/22/2014 02:31 PM      Lab Results   Component Value Date/Time    Glucose 101 (H) 01/25/2019 08:03 AM         Specific concerns today: Ongoing issues with pelvic discomfort associated with urinary frequency. She does have lichen sclerosis and is being treated by the gynecologist for that. Blood pressures been under good control. Otherwise review of systems is completely negative. .    Review of Systems  A comprehensive review of systems was negative except for that written in the HPI. Objective:   Blood pressure 130/87, pulse 72, temperature 97.8 °F (36.6 °C), temperature source Oral, resp.  rate 14, height 5' 4\" (1.626 m), weight 151 lb (68.5 kg), SpO2 95 %. Physical Examination:   General appearance - alert, well appearing, and in no distress  Eyes - pupils equal and reactive, extraocular eye movements intact  Ears - bilateral TM's and external ear canals normal  Nose - normal and patent, no erythema, discharge or polyps  Mouth - mucous membranes moist, pharynx normal without lesions  Neck - supple, no significant adenopathy  Lymphatics - no palpable lymphadenopathy, no hepatosplenomegaly  Chest - clear to auscultation, no wheezes, rales or rhonchi, symmetric air entry  Heart - normal rate, regular rhythm, normal S1, S2, no murmurs, rubs, clicks or gallops  Abdomen - soft, nontender, nondistended, no masses or organomegaly  Back exam - full range of motion, no tenderness, palpable spasm or pain on motion  Neurological - alert, oriented, normal speech, no focal findings or movement disorder noted  Musculoskeletal - no joint tenderness, deformity or swelling  Extremities - peripheral pulses normal, no pedal edema, no clubbing or cyanosis   Results for orders placed or performed in visit on 01/24/20   AMB POC URINALYSIS DIP STICK AUTO W/O MICRO   Result Value Ref Range    Color (UA POC) Yellow     Clarity (UA POC) Turbid     Glucose (UA POC) Negative Negative    Bilirubin (UA POC) Negative Negative    Ketones (UA POC) Negative Negative    Specific gravity (UA POC) 1.015 1.001 - 1.035    Blood (UA POC) Negative Negative    pH (UA POC) 7.0 4.6 - 8.0    Protein (UA POC) Negative Negative    Urobilinogen (UA POC) 0.2 mg/dL 0.2 - 1    Nitrites (UA POC) Positive Negative    Leukocyte esterase (UA POC) Trace Negative       Assessment/Plan:     lose weight, increase physical activity, bring BP log to office visit, follow low fat diet, follow low salt diet, routine labs ordered  Diagnoses and all orders for this visit:    1.  Routine general medical examination at a health care facility  -     METABOLIC PANEL, COMPREHENSIVE  - CBC WITH AUTOMATED DIFF  -     LIPID PANEL  -     TSH RFX ON ABNORMAL TO FREE T4  -     HEMOGLOBIN A1C WITH EAG    2. Urinary frequency -await urine culture. -     AMB POC URINALYSIS DIP STICK AUTO W/O MICRO  -     UA/M W/RFLX CULTURE, COMP  3. Hypertension-blood pressure well controlled.

## 2020-01-28 LAB
ALBUMIN SERPL-MCNC: 3.9 G/DL (ref 3.8–4.8)
ALBUMIN/GLOB SERPL: 1.8 {RATIO} (ref 1.2–2.2)
ALP SERPL-CCNC: 84 IU/L (ref 39–117)
ALT SERPL-CCNC: 15 IU/L (ref 0–32)
AST SERPL-CCNC: 15 IU/L (ref 0–40)
BASOPHILS # BLD AUTO: 0.1 X10E3/UL (ref 0–0.2)
BASOPHILS NFR BLD AUTO: 1 %
BILIRUB SERPL-MCNC: 0.6 MG/DL (ref 0–1.2)
BUN SERPL-MCNC: 13 MG/DL (ref 8–27)
BUN/CREAT SERPL: 18 (ref 12–28)
CALCIUM SERPL-MCNC: 9.1 MG/DL (ref 8.7–10.3)
CHLORIDE SERPL-SCNC: 99 MMOL/L (ref 96–106)
CHOLEST SERPL-MCNC: 200 MG/DL (ref 100–199)
CO2 SERPL-SCNC: 26 MMOL/L (ref 20–29)
CREAT SERPL-MCNC: 0.73 MG/DL (ref 0.57–1)
EOSINOPHIL # BLD AUTO: 0.2 X10E3/UL (ref 0–0.4)
EOSINOPHIL NFR BLD AUTO: 3 %
ERYTHROCYTE [DISTWIDTH] IN BLOOD BY AUTOMATED COUNT: 13.6 % (ref 11.7–15.4)
EST. AVERAGE GLUCOSE BLD GHB EST-MCNC: 108 MG/DL
GLOBULIN SER CALC-MCNC: 2.2 G/DL (ref 1.5–4.5)
GLUCOSE SERPL-MCNC: 86 MG/DL (ref 65–99)
HBA1C MFR BLD: 5.4 % (ref 4.8–5.6)
HCT VFR BLD AUTO: 41.2 % (ref 34–46.6)
HDLC SERPL-MCNC: 37 MG/DL
HGB BLD-MCNC: 13.7 G/DL (ref 11.1–15.9)
IMM GRANULOCYTES # BLD AUTO: 0.1 X10E3/UL (ref 0–0.1)
IMM GRANULOCYTES NFR BLD AUTO: 1 %
LDLC SERPL CALC-MCNC: 136 MG/DL (ref 0–99)
LYMPHOCYTES # BLD AUTO: 2 X10E3/UL (ref 0.7–3.1)
LYMPHOCYTES NFR BLD AUTO: 30 %
MCH RBC QN AUTO: 30.4 PG (ref 26.6–33)
MCHC RBC AUTO-ENTMCNC: 33.3 G/DL (ref 31.5–35.7)
MCV RBC AUTO: 91 FL (ref 79–97)
MONOCYTES # BLD AUTO: 0.4 X10E3/UL (ref 0.1–0.9)
MONOCYTES NFR BLD AUTO: 6 %
NEUTROPHILS # BLD AUTO: 4.1 X10E3/UL (ref 1.4–7)
NEUTROPHILS NFR BLD AUTO: 59 %
PLATELET # BLD AUTO: 238 X10E3/UL (ref 150–450)
POTASSIUM SERPL-SCNC: 4.4 MMOL/L (ref 3.5–5.2)
PROT SERPL-MCNC: 6.1 G/DL (ref 6–8.5)
RBC # BLD AUTO: 4.51 X10E6/UL (ref 3.77–5.28)
SODIUM SERPL-SCNC: 140 MMOL/L (ref 134–144)
TRIGL SERPL-MCNC: 135 MG/DL (ref 0–149)
TSH SERPL DL<=0.005 MIU/L-ACNC: 3.33 UIU/ML (ref 0.45–4.5)
VLDLC SERPL CALC-MCNC: 27 MG/DL (ref 5–40)
WBC # BLD AUTO: 6.8 X10E3/UL (ref 3.4–10.8)

## 2020-01-29 LAB
APPEARANCE UR: ABNORMAL
BACTERIA #/AREA URNS HPF: ABNORMAL /[HPF]
BACTERIA UR CULT: ABNORMAL
BILIRUB UR QL STRIP: NEGATIVE
CASTS URNS QL MICRO: ABNORMAL /LPF
COLOR UR: YELLOW
EPI CELLS #/AREA URNS HPF: ABNORMAL /HPF (ref 0–10)
GLUCOSE UR QL: NEGATIVE
HGB UR QL STRIP: NEGATIVE
KETONES UR QL STRIP: NEGATIVE
LEUKOCYTE ESTERASE UR QL STRIP: ABNORMAL
MICRO URNS: ABNORMAL
MUCOUS THREADS URNS QL MICRO: PRESENT
NITRITE UR QL STRIP: POSITIVE
PH UR STRIP: 6.5 [PH] (ref 5–7.5)
PROT UR QL STRIP: NEGATIVE
RBC #/AREA URNS HPF: ABNORMAL /HPF (ref 0–2)
SP GR UR: 1.01 (ref 1–1.03)
URINALYSIS REFLEX , 377201: ABNORMAL
UROBILINOGEN UR STRIP-MCNC: 0.2 MG/DL (ref 0.2–1)
WBC #/AREA URNS HPF: ABNORMAL /HPF (ref 0–5)

## 2020-01-30 ENCOUNTER — TELEPHONE (OUTPATIENT)
Dept: INTERNAL MEDICINE CLINIC | Age: 65
End: 2020-01-30

## 2020-01-30 DIAGNOSIS — N39.0 URINARY TRACT INFECTION WITH HEMATURIA, SITE UNSPECIFIED: Primary | ICD-10-CM

## 2020-01-30 DIAGNOSIS — R31.9 URINARY TRACT INFECTION WITH HEMATURIA, SITE UNSPECIFIED: Primary | ICD-10-CM

## 2020-01-30 RX ORDER — SULFAMETHOXAZOLE AND TRIMETHOPRIM 800; 160 MG/1; MG/1
1 TABLET ORAL 2 TIMES DAILY
Qty: 14 TAB | Refills: 0 | Status: SHIPPED | OUTPATIENT
Start: 2020-01-30 | End: 2020-02-06

## 2020-01-30 NOTE — TELEPHONE ENCOUNTER
Spoke with pt in ref to lab results. All las stable or at goal. Ucx did grow >100,000 colonies of Ecoli. Per SRJ, will treat with Bactrim DS BID for 7 days and repeat ua/cx when finished abx. Rx sent to pharm. Red flags reviewed. Verbalized understanding. Orders Placed This Encounter    UA/M W/RFLX CULTURE, COMP    trimethoprim-sulfamethoxazole (BACTRIM DS, SEPTRA DS) 160-800 mg per tablet     Sig: Take 1 Tab by mouth two (2) times a day for 7 days. Dispense:  14 Tab     Refill:  0     The above orders were approved via VOR[facility] per Dr. Dora Payne, III.

## 2020-01-30 NOTE — TELEPHONE ENCOUNTER
----- Message from Juan Evans MD sent at 1/29/2020  3:26 PM EST -----  Notify UTI - Bactrim DS BID for 7 days. Repeat urine after treatment.

## 2020-02-21 ENCOUNTER — TELEPHONE (OUTPATIENT)
Dept: INTERNAL MEDICINE CLINIC | Age: 65
End: 2020-02-21

## 2020-02-21 DIAGNOSIS — N39.0 FREQUENT UTI: Primary | ICD-10-CM

## 2020-02-21 LAB
APPEARANCE UR: ABNORMAL
BACTERIA #/AREA URNS HPF: ABNORMAL /[HPF]
BACTERIA UR CULT: ABNORMAL
BILIRUB UR QL STRIP: NEGATIVE
CASTS URNS QL MICRO: ABNORMAL /LPF
COLOR UR: YELLOW
EPI CELLS #/AREA URNS HPF: ABNORMAL /HPF (ref 0–10)
GLUCOSE UR QL: NEGATIVE
HGB UR QL STRIP: NEGATIVE
KETONES UR QL STRIP: NEGATIVE
LEUKOCYTE ESTERASE UR QL STRIP: ABNORMAL
MICRO URNS: ABNORMAL
MUCOUS THREADS URNS QL MICRO: PRESENT
NITRITE UR QL STRIP: POSITIVE
PH UR STRIP: 6 [PH] (ref 5–7.5)
PROT UR QL STRIP: NEGATIVE
RBC #/AREA URNS HPF: ABNORMAL /HPF (ref 0–2)
SP GR UR: 1.01 (ref 1–1.03)
URINALYSIS REFLEX , 377201: ABNORMAL
UROBILINOGEN UR STRIP-MCNC: 0.2 MG/DL (ref 0.2–1)
WBC #/AREA URNS HPF: ABNORMAL /HPF (ref 0–5)

## 2020-02-21 RX ORDER — NITROFURANTOIN 25; 75 MG/1; MG/1
100 CAPSULE ORAL 2 TIMES DAILY
Qty: 20 CAP | Refills: 0 | Status: SHIPPED | OUTPATIENT
Start: 2020-02-21 | End: 2020-03-02

## 2020-02-21 NOTE — TELEPHONE ENCOUNTER
----- Message from Ion Menezes MD sent at 2/21/2020  5:21 PM EST -----  Notify has UTI -Macrobid 100 mg BID for 10 days. Repeat urine after treatment.

## 2020-02-24 NOTE — TELEPHONE ENCOUNTER
Spoke with pt and advised that ucx grew >100,000 colony if [employer]	[place]. Per SRJ, will treat with Macrobid 100 mg BID for 10 days and repeat ua/cx after finishing abx. Rx sent to pharm. Lab slip at . Verbalized understanding. Orders Placed This Encounter    UA/M W/RFLX CULTURE, COMP    nitrofurantoin, macrocrystal-monohydrate, (MACROBID) 100 mg capsule     Sig: Take 1 Cap by mouth two (2) times a day for 10 days. Dispense:  20 Cap     Refill:  0     The above orders were approved via VORB per Dr. Parthenia Leventhal, III.

## 2020-02-27 ENCOUNTER — PATIENT MESSAGE (OUTPATIENT)
Dept: INTERNAL MEDICINE CLINIC | Age: 65
End: 2020-02-27

## 2020-03-01 RX ORDER — FLUCONAZOLE 150 MG/1
TABLET ORAL
Qty: 2 TAB | Refills: 0 | Status: SHIPPED | OUTPATIENT
Start: 2020-03-01 | End: 2020-10-26 | Stop reason: ALTCHOICE

## 2020-05-02 RX ORDER — NEBIVOLOL HYDROCHLORIDE 10 MG/1
TABLET ORAL
Qty: 30 TAB | Refills: 8 | Status: SHIPPED | OUTPATIENT
Start: 2020-05-02 | End: 2020-06-04

## 2020-06-04 RX ORDER — METOPROLOL SUCCINATE 50 MG/1
50 TABLET, EXTENDED RELEASE ORAL EVERY EVENING
Qty: 30 TAB | Refills: 1 | Status: SHIPPED | OUTPATIENT
Start: 2020-06-04 | End: 2020-08-03

## 2020-06-19 ENCOUNTER — TELEPHONE (OUTPATIENT)
Dept: CARDIOLOGY CLINIC | Age: 65
End: 2020-06-19

## 2020-06-19 NOTE — TELEPHONE ENCOUNTER
Please call her, she did not read my mymission2 message from 6/4    \"Ok, then please stop taking your bystolic and begin taking metoprolol succinate/Toprol XL 50mg in the evening.  Please let me know if you develop any side effects, high or low blood pressure, etc.\"

## 2020-06-19 NOTE — TELEPHONE ENCOUNTER
The following message left on patient's confidential voice mail. Per NP Zayda Shrestha:    Please call her, she did not read my [employer] message from 6/4     \"Ok, then please stop taking your bystolic and begin taking metoprolol succinate/Toprol XL 50mg in the evening.  Please let me know if you develop any side effects, high or low blood pressure, etc.\"     Advised to return my call with any questions.

## 2020-08-28 RX ORDER — SPIRONOLACTONE 25 MG/1
25 TABLET ORAL DAILY
Qty: 30 TAB | Refills: 1 | Status: SHIPPED | OUTPATIENT
Start: 2020-08-28 | End: 2020-08-31

## 2020-08-31 RX ORDER — NEBIVOLOL 10 MG/1
10 TABLET ORAL
Qty: 90 TAB | Refills: 3 | Status: SHIPPED | OUTPATIENT
Start: 2020-08-31 | End: 2020-10-26 | Stop reason: ALTCHOICE

## 2020-10-26 ENCOUNTER — TELEPHONE (OUTPATIENT)
Dept: INTERNAL MEDICINE CLINIC | Age: 65
End: 2020-10-26

## 2020-10-26 ENCOUNTER — OFFICE VISIT (OUTPATIENT)
Dept: INTERNAL MEDICINE CLINIC | Age: 65
End: 2020-10-26
Payer: MEDICARE

## 2020-10-26 VITALS
RESPIRATION RATE: 12 BRPM | BODY MASS INDEX: 26.98 KG/M2 | HEART RATE: 75 BPM | HEIGHT: 64 IN | WEIGHT: 158 LBS | SYSTOLIC BLOOD PRESSURE: 154 MMHG | OXYGEN SATURATION: 96 % | TEMPERATURE: 98 F | DIASTOLIC BLOOD PRESSURE: 93 MMHG

## 2020-10-26 DIAGNOSIS — I10 ESSENTIAL HYPERTENSION: Primary | ICD-10-CM

## 2020-10-26 DIAGNOSIS — I49.9 IRREGULAR HEART RATE: ICD-10-CM

## 2020-10-26 DIAGNOSIS — R07.89 CHEST TIGHTNESS: ICD-10-CM

## 2020-10-26 PROCEDURE — 99214 OFFICE O/P EST MOD 30 MIN: CPT | Performed by: INTERNAL MEDICINE

## 2020-10-26 PROCEDURE — 93000 ELECTROCARDIOGRAM COMPLETE: CPT | Performed by: INTERNAL MEDICINE

## 2020-10-26 RX ORDER — CLOBETASOL PROPIONATE 0.5 MG/G
CREAM TOPICAL
Qty: 60 G | Refills: 1 | Status: SHIPPED | OUTPATIENT
Start: 2020-10-26 | End: 2021-01-29 | Stop reason: SDUPTHER

## 2020-10-26 RX ORDER — CARVEDILOL 12.5 MG/1
12.5 TABLET ORAL 2 TIMES DAILY WITH MEALS
Qty: 60 TAB | Refills: 1 | Status: SHIPPED | OUTPATIENT
Start: 2020-10-26 | End: 2020-11-18

## 2020-10-26 NOTE — PROGRESS NOTES
HPI:  Binh Rodas is a 72y.o. year old female who is here for issues with her blood pressure. She has noted that her blood pressures been higher recently with readings as high as 742 for the systolic and in the 67O for diastolic. She was previously on Bystolic 10 mg. This was switched to metoprolol due to cost back in the summer. Her blood pressures were not well controlled with that so she was switched back to Bystolic. She has noted some intermittent episodes of chest tightness unassociated with exertion. She has been followed by cardiology and had a stress test about 6 years ago that was normal.  Dr. Aashish Villatoro did recommend that she consider another cardiac work-up this year for her follow-up. She has an appointment at the end of next month. No exertional chest pain. No dyspnea. No PND or orthopnea. No nausea or vomiting. Her weight is up about 7 pounds unintentionally. She also needed a refill for her clobetasol that have been given to her by gynecology for lichen sclerosis. Past Medical History:   Diagnosis Date    Fracture 2016    Right foot fx (pt turned foot while stepping off curb)    GERD (gastroesophageal reflux disease)     HX    Headache(784.0) 12/17/2009    Mixed hyperlipidemia 12/17/2009    Rosacea 2017       Past Surgical History:   Procedure Laterality Date    HX BLADDER SUSPENSION  5/2014    HX DILATION AND CURETTAGE      HX TUBAL LIGATION      TITA US BX BREAST LT 1ST LESION W/CLIP AND SPECIMEN Left 12/09/2016    benign       Prior to Admission medications    Medication Sig Start Date End Date Taking? Authorizing Provider   clobetasoL (TEMOVATE) 0.05 % topical cream prn 10/26/20  Yes Junaid Arreola MD   carvediloL (COREG) 12.5 mg tablet Take 1 Tab by mouth two (2) times daily (with meals). 10/26/20  Yes Junaid Arreola MD   nebivoloL (Bystolic) 10 mg tablet Take 1 Tab by mouth nightly.  8/31/20 10/26/20  Naida Mckee NP   fluconazole (DIFLUCAN) 150 mg tablet One today and repeat in 5 days if needed. 3/1/20 10/26/20  Breann Villagran III, MD   doxycycline (MONODOX) 100 mg capsule Take 100 mg by mouth two (2) times a day. 10/26/20  Provider, Historical   magnesium oxide (MAG-OX) 400 mg tablet Take 1 Tab by mouth daily. 1/24/20 10/26/20  Breann Villagran III, MD   clobetasol (TEMOVATE) 0.05 % topical cream prn 11/14/17 10/26/20  Provider, Historical       Social History     Socioeconomic History    Marital status:      Spouse name: Not on file    Number of children: Not on file    Years of education: Not on file    Highest education level: Not on file   Occupational History    Not on file   Social Needs    Financial resource strain: Not on file    Food insecurity     Worry: Not on file     Inability: Not on file    Transportation needs     Medical: Not on file     Non-medical: Not on file   Tobacco Use    Smoking status: Never Smoker    Smokeless tobacco: Never Used   Substance and Sexual Activity    Alcohol use:  Yes     Alcohol/week: 1.7 standard drinks     Types: 2 Glasses of wine per week     Frequency: Monthly or less     Drinks per session: 1 or 2     Binge frequency: Never     Comment: WEEKLY    Drug use: No    Sexual activity: Yes     Partners: Male   Lifestyle    Physical activity     Days per week: Not on file     Minutes per session: Not on file    Stress: Not on file   Relationships    Social connections     Talks on phone: Not on file     Gets together: Not on file     Attends Sabianism service: Not on file     Active member of club or organization: Not on file     Attends meetings of clubs or organizations: Not on file     Relationship status: Not on file    Intimate partner violence     Fear of current or ex partner: Not on file     Emotionally abused: Not on file     Physically abused: Not on file     Forced sexual activity: Not on file   Other Topics Concern    Not on file   Social History Narrative    Not on file          ROS  Per HPI    Visit Vitals  BP (!) 154/93   Pulse 75   Temp 98 °F (36.7 °C) (Temporal)   Resp 12   Ht 5' 4\" (1.626 m)   Wt 158 lb (71.7 kg)   SpO2 96%   BMI 27.12 kg/m²         Physical Exam   Physical Examination: General appearance - alert, well appearing, and in no distress  Mouth - mucous membranes moist, pharynx normal without lesions  Neck - supple, no significant adenopathy  Lymphatics - no palpable lymphadenopathy, no hepatosplenomegaly  Chest - clear to auscultation, no wheezes, rales or rhonchi, symmetric air entry  Heart - normal rate and regular rhythm, frequent ectopy  Abdomen - soft, nontender, nondistended, no masses or organomegaly  Neurological - alert, oriented, normal speech, no focal findings or movement disorder noted  Musculoskeletal - no joint tenderness, deformity or swelling  Extremities - peripheral pulses normal, no pedal edema, no clubbing or cyanosis      Assessment/Plan:  Diagnoses and all orders for this visit:    1. Essential hypertensionblood pressure not well controlled. Will switch to carvedilol for cost reasons and see if her blood pressures under better control. She will report blood pressure readings in 2 weeks or so. -     AMB POC EKG ROUTINE W/ 12 LEADS, INTER & REP    2. Irregular heart ratenoted on exam today. EKG today shows sinus rhythm within normal limits. Will monitor. 3. Chest tightnesswill defer to cardiology but send Dr. Chapo Campbell a message to ask her to schedule a stress test as she had planned. Other orders  -     clobetasoL (TEMOVATE) 0.05 % topical cream; prn  -     carvediloL (COREG) 12.5 mg tablet; Take 1 Tab by mouth two (2) times daily (with meals). Advised her to call back or return to office if symptoms worsen/change/persist.  Discussed expected course/resolution/complications of diagnosis in detail with patient. Medication risks/benefits/costs/interactions/alternatives discussed with patient.   She was given an after visit summary which includes diagnoses, current medications, & vitals. She expressed understanding with the diagnosis and plan.

## 2020-10-28 ENCOUNTER — TELEPHONE (OUTPATIENT)
Dept: CARDIOLOGY CLINIC | Age: 65
End: 2020-10-28

## 2020-10-28 DIAGNOSIS — R07.9 CHEST PAIN AT REST: Primary | ICD-10-CM

## 2020-10-28 DIAGNOSIS — R00.2 INTERMITTENT PALPITATIONS: ICD-10-CM

## 2020-10-28 NOTE — TELEPHONE ENCOUNTER
Patient is having sx of atypical chest tightness and had some irregular beats noted on exam with PCP. Per Dr. Jackeline lopez to schedule a stress echo for above sx. Scheduled with patient and instructions provided.  2 pt identifiers used        Future Appointments   Date Time Provider Kathy Saba   11/3/2020  4:00 PM ECHOTALEX STEWART BS AMB   11/3/2020  4:00 PM ALEX HERNANDEZ AMB   11/30/2020  1:20 PM MD CLIVE Mendez BS AMB   1/29/2021 10:30 AM Larry Jaffe MD [facility] YEISON BS AMB

## 2020-11-03 ENCOUNTER — APPOINTMENT (OUTPATIENT)
Dept: CARDIOLOGY CLINIC | Age: 65
End: 2020-11-03

## 2020-11-03 ENCOUNTER — ANCILLARY PROCEDURE (OUTPATIENT)
Dept: CARDIOLOGY CLINIC | Age: 65
End: 2020-11-03
Payer: MEDICARE

## 2020-11-03 VITALS — WEIGHT: 158 LBS | BODY MASS INDEX: 26.98 KG/M2 | HEIGHT: 64 IN

## 2020-11-03 DIAGNOSIS — R07.9 CHEST PAIN AT REST: ICD-10-CM

## 2020-11-03 DIAGNOSIS — R00.2 INTERMITTENT PALPITATIONS: ICD-10-CM

## 2020-11-03 LAB
STRESS ANGINA INDEX: 0
STRESS BASELINE DIAS BP: 108 MMHG
STRESS BASELINE HR: 97 BPM
STRESS BASELINE SYS BP: 170 MMHG
STRESS ESTIMATED WORKLOAD: 7 METS
STRESS EXERCISE DUR MIN: NORMAL MIN:SEC
STRESS O2 SAT PEAK: 95 %
STRESS O2 SAT REST: 98 %
STRESS PEAK DIAS BP: 100 MMHG
STRESS PEAK SYS BP: 200 MMHG
STRESS PERCENT HR ACHIEVED: 110 %
STRESS POST PEAK HR: 171 BPM
STRESS RATE PRESSURE PRODUCT: NORMAL BPM*MMHG
STRESS SR DUKE TREADMILL SCORE: 3
STRESS ST DEPRESSION: 0.5 MM
STRESS TARGET HR: 155 BPM

## 2020-11-03 PROCEDURE — 93351 STRESS TTE COMPLETE: CPT | Performed by: INTERNAL MEDICINE

## 2020-11-18 RX ORDER — CARVEDILOL 12.5 MG/1
TABLET ORAL
Qty: 60 TAB | Refills: 1 | Status: SHIPPED | OUTPATIENT
Start: 2020-11-18 | End: 2020-12-13

## 2020-11-30 ENCOUNTER — OFFICE VISIT (OUTPATIENT)
Dept: CARDIOLOGY CLINIC | Age: 65
End: 2020-11-30
Payer: MEDICARE

## 2020-11-30 VITALS
DIASTOLIC BLOOD PRESSURE: 90 MMHG | HEART RATE: 67 BPM | OXYGEN SATURATION: 98 % | WEIGHT: 156.6 LBS | RESPIRATION RATE: 14 BRPM | HEIGHT: 64 IN | BODY MASS INDEX: 26.73 KG/M2 | SYSTOLIC BLOOD PRESSURE: 130 MMHG

## 2020-11-30 DIAGNOSIS — R07.9 CHEST PAIN AT REST: Primary | ICD-10-CM

## 2020-11-30 DIAGNOSIS — I10 ESSENTIAL HYPERTENSION: ICD-10-CM

## 2020-11-30 DIAGNOSIS — R00.2 PALPITATIONS: ICD-10-CM

## 2020-11-30 DIAGNOSIS — R00.2 INTERMITTENT PALPITATIONS: ICD-10-CM

## 2020-11-30 PROCEDURE — G9899 SCRN MAM PERF RSLTS DOC: HCPCS | Performed by: INTERNAL MEDICINE

## 2020-11-30 PROCEDURE — G8419 CALC BMI OUT NRM PARAM NOF/U: HCPCS | Performed by: INTERNAL MEDICINE

## 2020-11-30 PROCEDURE — G8536 NO DOC ELDER MAL SCRN: HCPCS | Performed by: INTERNAL MEDICINE

## 2020-11-30 PROCEDURE — 1090F PRES/ABSN URINE INCON ASSESS: CPT | Performed by: INTERNAL MEDICINE

## 2020-11-30 PROCEDURE — G8510 SCR DEP NEG, NO PLAN REQD: HCPCS | Performed by: INTERNAL MEDICINE

## 2020-11-30 PROCEDURE — 1101F PT FALLS ASSESS-DOCD LE1/YR: CPT | Performed by: INTERNAL MEDICINE

## 2020-11-30 PROCEDURE — G8399 PT W/DXA RESULTS DOCUMENT: HCPCS | Performed by: INTERNAL MEDICINE

## 2020-11-30 PROCEDURE — G8755 DIAS BP > OR = 90: HCPCS | Performed by: INTERNAL MEDICINE

## 2020-11-30 PROCEDURE — 99214 OFFICE O/P EST MOD 30 MIN: CPT | Performed by: INTERNAL MEDICINE

## 2020-11-30 PROCEDURE — G8427 DOCREV CUR MEDS BY ELIG CLIN: HCPCS | Performed by: INTERNAL MEDICINE

## 2020-11-30 PROCEDURE — 3017F COLORECTAL CA SCREEN DOC REV: CPT | Performed by: INTERNAL MEDICINE

## 2020-11-30 PROCEDURE — G8752 SYS BP LESS 140: HCPCS | Performed by: INTERNAL MEDICINE

## 2020-11-30 NOTE — PROGRESS NOTES
[facility]: Radha Loera  [phone]    HPI: Juan Trevino, a 72y.o. year-old who presents for follow up regarding her HTN. /80 at home, has rare BP readings in the 140/90 range but usually pretty goo dnow, up just a bit today  No headaches  No dizziness or syncope  No chest pain  No LE edema      Not having any more epigastric pain  No dyspnea with exertion, no PND    Just finished building a house in [facility] with 3 stories   Not having any palpitations recently   Having labs again at visit with Dr. Matthew Manzano   She is not exercising much   Tingling in right hand got much better after carpal tunnel surgery    She has been out walking and trying to exercise some. 30-45 minutes each time. A few times a week with some incline. Encouraged her to add in some strength training. Review of cad risk factors an healthy lifestyle changes to focus on  Review of stress and bp and heart health    Assessment/Plan:  1. Dyspnea - none currently    2. HTN- well controlled on coreg 12.5mg BId  3. GERD - on PPI  -hx of hiatal hernia and extensive diverticulosis on CT in the past   4. Chest discomfort - none currently    5. Palpitations - well controlled on bblocker    Ca score zero   Stress Echo  - walked 9 min, 108% PMHR, no ischemia     Fam Hx: Mom with HTN, smoker,  of MI at 78, had CVA in her 76s, brothers with HTN  Soc hx: no tob use, drinks 1-2 alcoholic drinks/week    She  has a past medical history of Fracture (2016), GERD (gastroesophageal reflux disease), Headache(784.0) (2009), Mixed hyperlipidemia (2009), and Rosacea (2017). Cardiovascular ROS: no chest pain or palpitations   Respiratory ROS: no dyspnea with exertion, positive for cough    Neurological ROS: no TIA or stroke symptoms  All other systems negative except as above.      PE  Vitals:    20 1328   BP: (!) 130/90   Pulse: 67   Resp: 14   SpO2: 98%   Weight: 156 lb 9.6 oz (71 kg)   Height: 5' 4\" (1.626 m)    Body mass index is 26.88 kg/m².    General appearance - alert, well appearing, and in no distress  Mental status - affect appropriate to mood  Eyes - sclera anicteric, moist mucous membranes  Neck - supple, no carotid bruits   Lymphatics - not assessed   Chest - right base with rhonchi, left lung clear   Heart - normal rate, regular rhythm, normal S1, S2, no murmurs, rubs, clicks or gallops  Abdomen - soft, nontender, nondistended  Back exam - full range of motion, no tenderness  Neurological - cranial nerves II through XII grossly intact, no focal deficit  Musculoskeletal - no muscular tenderness noted, normal strength  Extremities - peripheral pulses normal, no pedal edema  Skin - normal coloration  no rashes    12 lead ECG: NSR     Recent Labs:  Lab Results   Component Value Date/Time    Cholesterol, total 200 (H) 01/27/2020 08:30 AM    HDL Cholesterol 37 (L) 01/27/2020 08:30 AM    LDL, calculated 136 (H) 01/27/2020 08:30 AM    Triglyceride 135 01/27/2020 08:30 AM    CHOL/HDL Ratio 3.9 12/18/2009 07:45 AM     Lab Results   Component Value Date/Time    Creatinine 0.73 01/27/2020 08:30 AM     Lab Results   Component Value Date/Time    BUN 13 01/27/2020 08:30 AM     Lab Results   Component Value Date/Time    Potassium 4.4 01/27/2020 08:30 AM     Lab Results   Component Value Date/Time    Hemoglobin A1c 5.4 01/27/2020 08:30 AM     Lab Results   Component Value Date/Time    HGB 13.7 01/27/2020 08:30 AM     Lab Results   Component Value Date/Time    PLATELET 611 20/56/2274 08:30 AM       Reviewed:  Past Medical History:   Diagnosis Date    Fracture 2016    Right foot fx (pt turned foot while stepping off curb)    GERD (gastroesophageal reflux disease)     HX    Headache(784.0) 12/17/2009    Mixed hyperlipidemia 12/17/2009    Rosacea 2017     Social History     Tobacco Use   Smoking Status Never Smoker   Smokeless Tobacco Never Used     Social History     Substance and Sexual Activity   Alcohol Use Yes    Alcohol/week: 1.7 standard drinks    Types: 2 Glasses of wine per week    Frequency: Monthly or less    Drinks per session: 1 or 2    Binge frequency: Never    Comment: WEEKLY     Allergies   Allergen Reactions    Morphine Nausea Only    Zithromax [Azithromycin] Nausea Only       Current Outpatient Medications   Medication Sig    carvediloL (COREG) 12.5 mg tablet TAKE 1 TABLET BY MOUTH TWICE A DAY WITH MEALS    clobetasoL (TEMOVATE) 0.05 % topical cream prn     No current facility-administered medications for this visit.         Liz Villalobos MD  [facility] [place]  [address] [facility] [address]  [facility], [address]

## 2020-12-10 ENCOUNTER — OFFICE VISIT (OUTPATIENT)
Dept: INTERNAL MEDICINE CLINIC | Age: 65
End: 2020-12-10
Payer: MEDICARE

## 2020-12-10 VITALS
OXYGEN SATURATION: 97 % | TEMPERATURE: 97.8 F | SYSTOLIC BLOOD PRESSURE: 133 MMHG | BODY MASS INDEX: 26.8 KG/M2 | HEIGHT: 64 IN | RESPIRATION RATE: 16 BRPM | DIASTOLIC BLOOD PRESSURE: 83 MMHG | HEART RATE: 80 BPM | WEIGHT: 157 LBS

## 2020-12-10 DIAGNOSIS — I10 ESSENTIAL HYPERTENSION: ICD-10-CM

## 2020-12-10 DIAGNOSIS — R39.9 UTI SYMPTOMS: Primary | ICD-10-CM

## 2020-12-10 LAB
BILIRUB UR QL STRIP: NEGATIVE
GLUCOSE UR-MCNC: NEGATIVE MG/DL
KETONES P FAST UR STRIP-MCNC: NEGATIVE MG/DL
PH UR STRIP: 7 [PH] (ref 4.6–8)
PROT UR QL STRIP: NEGATIVE
SP GR UR STRIP: 1.01 (ref 1–1.03)
UA UROBILINOGEN AMB POC: NORMAL (ref 0.2–1)
URINALYSIS CLARITY POC: NORMAL
URINALYSIS COLOR POC: YELLOW
URINE BLOOD POC: NEGATIVE
URINE LEUKOCYTES POC: NORMAL
URINE NITRITES POC: POSITIVE

## 2020-12-10 PROCEDURE — G8432 DEP SCR NOT DOC, RNG: HCPCS | Performed by: INTERNAL MEDICINE

## 2020-12-10 PROCEDURE — G8419 CALC BMI OUT NRM PARAM NOF/U: HCPCS | Performed by: INTERNAL MEDICINE

## 2020-12-10 PROCEDURE — 99213 OFFICE O/P EST LOW 20 MIN: CPT | Performed by: INTERNAL MEDICINE

## 2020-12-10 PROCEDURE — G8427 DOCREV CUR MEDS BY ELIG CLIN: HCPCS | Performed by: INTERNAL MEDICINE

## 2020-12-10 PROCEDURE — 3017F COLORECTAL CA SCREEN DOC REV: CPT | Performed by: INTERNAL MEDICINE

## 2020-12-10 PROCEDURE — 81003 URINALYSIS AUTO W/O SCOPE: CPT | Performed by: INTERNAL MEDICINE

## 2020-12-10 PROCEDURE — G8399 PT W/DXA RESULTS DOCUMENT: HCPCS | Performed by: INTERNAL MEDICINE

## 2020-12-10 PROCEDURE — G8752 SYS BP LESS 140: HCPCS | Performed by: INTERNAL MEDICINE

## 2020-12-10 PROCEDURE — G8754 DIAS BP LESS 90: HCPCS | Performed by: INTERNAL MEDICINE

## 2020-12-10 PROCEDURE — G8536 NO DOC ELDER MAL SCRN: HCPCS | Performed by: INTERNAL MEDICINE

## 2020-12-10 PROCEDURE — 1101F PT FALLS ASSESS-DOCD LE1/YR: CPT | Performed by: INTERNAL MEDICINE

## 2020-12-10 PROCEDURE — G9899 SCRN MAM PERF RSLTS DOC: HCPCS | Performed by: INTERNAL MEDICINE

## 2020-12-10 PROCEDURE — 1090F PRES/ABSN URINE INCON ASSESS: CPT | Performed by: INTERNAL MEDICINE

## 2020-12-10 RX ORDER — NITROFURANTOIN 25; 75 MG/1; MG/1
100 CAPSULE ORAL 2 TIMES DAILY
Qty: 14 CAP | Refills: 0 | Status: SHIPPED | OUTPATIENT
Start: 2020-12-10 | End: 2020-12-14 | Stop reason: ALTCHOICE

## 2020-12-10 NOTE — PROGRESS NOTES
HPI:  Clement Worley is a 72y.o. year old female who is here for a 1 week history of lower abdominal pressure associated with increased urinary frequency urea. No fevers or chills. No nausea or vomiting. No change in bowel habits. She has had UTIs in the past with the last being in February of this year. At that point she grew E. coli that was resistant to Bactrim. She also recently saw the cardiologist for follow-up and they suggested a heart calcium score which she has not had in 6 years. Past Medical History:   Diagnosis Date    Fracture 2016    Right foot fx (pt turned foot while stepping off curb)    GERD (gastroesophageal reflux disease)     HX    Headache(784.0) 12/17/2009    Mixed hyperlipidemia 12/17/2009    Rosacea 2017       Past Surgical History:   Procedure Laterality Date    HX BLADDER SUSPENSION  5/2014    HX DILATION AND CURETTAGE      HX TUBAL LIGATION      TITA US BX BREAST LT 1ST LESION W/CLIP AND SPECIMEN Left 12/09/2016    benign       Prior to Admission medications    Medication Sig Start Date End Date Taking? Authorizing Provider   nitrofurantoin, macrocrystal-monohydrate, (MACROBID) 100 mg capsule Take 1 Cap by mouth two (2) times a day.  12/10/20  Yes Isabel Arnold MD   carvediloL (COREG) 12.5 mg tablet TAKE 1 TABLET BY MOUTH TWICE A DAY WITH MEALS 11/18/20  Yes Martha Napoles III, MD   clobetasoL (TEMOVATE) 0.05 % topical cream prn 10/26/20  Yes Isabel Arnold MD       Social History     Socioeconomic History    Marital status:      Spouse name: Not on file    Number of children: Not on file    Years of education: Not on file    Highest education level: Not on file   Occupational History    Not on file   Social Needs    Financial resource strain: Not on file    Food insecurity     Worry: Not on file     Inability: Not on file    Transportation needs     Medical: Not on file     Non-medical: Not on file   Tobacco Use    Smoking status: Never Smoker    Smokeless tobacco: Never Used   Substance and Sexual Activity    Alcohol use:  Yes     Alcohol/week: 1.7 standard drinks     Types: 2 Glasses of wine per week     Frequency: Monthly or less     Drinks per session: 1 or 2     Binge frequency: Never     Comment: WEEKLY    Drug use: No    Sexual activity: Yes     Partners: Male   Lifestyle    Physical activity     Days per week: Not on file     Minutes per session: Not on file    Stress: Not on file   Relationships    Social connections     Talks on phone: Not on file     Gets together: Not on file     Attends Mandaeism service: Not on file     Active member of club or organization: Not on file     Attends meetings of clubs or organizations: Not on file     Relationship status: Not on file    Intimate partner violence     Fear of current or ex partner: Not on file     Emotionally abused: Not on file     Physically abused: Not on file     Forced sexual activity: Not on file   Other Topics Concern    Not on file   Social History Narrative    Not on file          ROS  Per HPI    Visit Vitals  /83   Pulse 80   Temp 97.8 °F (36.6 °C) (Temporal)   Resp 16   Ht 5' 4\" (1.626 m)   Wt 157 lb (71.2 kg)   SpO2 97%   BMI 26.95 kg/m²         Physical Exam   Physical Examination: General appearance - alert, well appearing, and in no distress  Chest - clear to auscultation, no wheezes, rales or rhonchi, symmetric air entry  Heart - normal rate, regular rhythm, normal S1, S2, no murmurs, rubs, clicks or gallops  Abdomen - soft, nontender, nondistended, no masses or organomegaly  Back exam - no CVA tenderness    Results for orders placed or performed in visit on 12/10/20   AMB POC URINALYSIS DIP STICK AUTO W/O MICRO   Result Value Ref Range    Color (UA POC) Yellow     Clarity (UA POC) Slightly Cloudy     Glucose (UA POC) Negative Negative    Bilirubin (UA POC) Negative Negative    Ketones (UA POC) Negative Negative    Specific gravity (UA POC) 1.015 1.001 - 1.035    Blood (UA POC) Negative Negative    pH (UA POC) 7.0 4.6 - 8.0    Protein (UA POC) Negative Negative    Urobilinogen (UA POC) 0.2 mg/dL 0.2 - 1    Nitrites (UA POC) Positive Negative    Leukocyte esterase (UA POC) 1+ Negative           Assessment/Plan:  Diagnoses and all orders for this visit:    1. UTI symptomsrecurrent UTI. Will treat with antibiotics. Await cultures to adjust.Agree with calcium score and that will be ordered and followed up with Dr. Marie Easley.  -     Jacklyn Boast; Future  -     URINALYSIS W/ RFLX MICROSCOPIC; Future    2. Essential hypertension  -     CT HEART W/O CONT WITH CALCIUM; Future    Other orders  -     nitrofurantoin, macrocrystal-monohydrate, (MACROBID) 100 mg capsule; Take 1 Cap by mouth two (2) times a day. Follow-up and Dispositions    · Return if symptoms worsen or fail to improve. Advised her to call back or return to office if symptoms worsen/change/persist.  Discussed expected course/resolution/complications of diagnosis in detail with patient. Medication risks/benefits/costs/interactions/alternatives discussed with patient. She was given an after visit summary which includes diagnoses, current medications, & vitals. She expressed understanding with the diagnosis and plan.

## 2020-12-10 NOTE — PATIENT INSTRUCTIONS
Diet for a Healthy Bladder: Care Instructions Your Care Instructions You can help your bladder stay healthy. Drink lots of water and eat a healthy diet. This may help prevent urinary tract infections and other bladder problems. Follow-up care is a key part of your treatment and safety. Be sure to make and go to all appointments, and call your doctor if you are having problems. It's also a good idea to know your test results and keep a list of the medicines you take. How can you care for yourself at home? · Drink plenty of water or other drinks that do not have alcohol. This will help flush bacteria from your bladder. If you have kidney, heart, or liver disease and have to limit fluids, talk with your doctor before you increase the amount of fluids you drink. · Limit or avoid alcohol. Alcohol can irritate the bladder. For some people, caffeine (found in coffee, tea, and cola drinks) also can irritate the bladder. · Avoid constipation. This can help some people who have a problem with an urgent need to urinate a lot. ? Include fruits, vegetables, cooked dry beans, and whole grains in your diet each day. These foods are high in fiber. ? Get at least 30 minutes of physical activity on most days of the week. Walking is a good choice. You also may want to do other activities, such as running, swimming, cycling, or playing tennis or team sports. ? Take a fiber supplement, such as Citrucel or Metamucil, every day if needed. Read and follow all instructions on the label. ? Schedule time each day for a bowel movement. Having a daily routine may help. Take your time and do not strain when having your bowel movement. Where can you learn more? Go to http://www.gray.com/ Enter C951 in the search box to learn more about \"Diet for a Healthy Bladder: Care Instructions. \" Current as of: August 22, 2019               Content Version: 12.6 © 7802-7510 [employer]. Care instructions adapted under license by [employer] (which disclaims liability or warranty for this information). If you have questions about a medical condition or this instruction, always ask your healthcare professional. [address] any warranty or liability for your use of this information.

## 2020-12-13 LAB
BACTERIA SPEC CULT: ABNORMAL
CC UR VC: ABNORMAL
SERVICE CMNT-IMP: ABNORMAL

## 2020-12-13 RX ORDER — CARVEDILOL 12.5 MG/1
TABLET ORAL
Qty: 60 TAB | Refills: 1 | Status: SHIPPED | OUTPATIENT
Start: 2020-12-13 | End: 2021-01-05

## 2020-12-14 ENCOUNTER — TELEPHONE (OUTPATIENT)
Dept: INTERNAL MEDICINE CLINIC | Age: 65
End: 2020-12-14

## 2020-12-14 RX ORDER — FLUCONAZOLE 150 MG/1
TABLET ORAL
Qty: 2 TAB | Refills: 0 | Status: SHIPPED | OUTPATIENT
Start: 2020-12-14 | End: 2021-01-29 | Stop reason: ALTCHOICE

## 2020-12-14 RX ORDER — SULFAMETHOXAZOLE AND TRIMETHOPRIM 800; 160 MG/1; MG/1
1 TABLET ORAL 2 TIMES DAILY
Qty: 14 TAB | Refills: 0 | Status: SHIPPED | OUTPATIENT
Start: 2020-12-14 | End: 2020-12-21

## 2020-12-14 NOTE — TELEPHONE ENCOUNTER
----- Message from Gilles Gonzaelz MD sent at 12/14/2020  8:17 AM EST -----  Change to bactrim DS BID for 7 days.

## 2021-01-05 RX ORDER — CARVEDILOL 12.5 MG/1
TABLET ORAL
Qty: 60 TAB | Refills: 1 | Status: SHIPPED | OUTPATIENT
Start: 2021-01-05 | End: 2021-01-31

## 2021-01-13 ENCOUNTER — TRANSCRIBE ORDER (OUTPATIENT)
Dept: SCHEDULING | Age: 66
End: 2021-01-13

## 2021-01-13 DIAGNOSIS — Z12.31 SCREENING MAMMOGRAM FOR HIGH-RISK PATIENT: Primary | ICD-10-CM

## 2021-01-25 ENCOUNTER — HOSPITAL ENCOUNTER (OUTPATIENT)
Dept: CT IMAGING | Age: 66
Discharge: HOME | End: 2021-01-25
Attending: INTERNAL MEDICINE
Payer: SELF-PAY

## 2021-01-25 ENCOUNTER — HOSPITAL ENCOUNTER (OUTPATIENT)
Dept: MAMMOGRAPHY | Age: 66
Discharge: HOME | End: 2021-01-25
Attending: INTERNAL MEDICINE
Payer: MEDICARE

## 2021-01-25 DIAGNOSIS — Z12.31 SCREENING MAMMOGRAM FOR HIGH-RISK PATIENT: ICD-10-CM

## 2021-01-25 DIAGNOSIS — I10 ESSENTIAL HYPERTENSION: ICD-10-CM

## 2021-01-25 PROCEDURE — 77067 SCR MAMMO BI INCL CAD: CPT

## 2021-01-25 PROCEDURE — 75571 CT HRT W/O DYE W/CA TEST: CPT

## 2021-01-26 ENCOUNTER — IMMUNIZATION (OUTPATIENT)
Dept: FAMILY MEDICINE CLINIC | Age: 66
End: 2021-01-26
Payer: MEDICARE

## 2021-01-26 DIAGNOSIS — Z23 ENCOUNTER FOR IMMUNIZATION: Primary | ICD-10-CM

## 2021-01-26 PROCEDURE — 91301 COVID-19, MRNA, LNP-S, PF, 100MCG/0.5ML DOSE(MODERNA): CPT | Performed by: FAMILY MEDICINE

## 2021-01-26 PROCEDURE — 0011A PR IMM ADMN SARSCOV2 100 MCG/0.5 ML 1ST DOSE: CPT | Performed by: FAMILY MEDICINE

## 2021-01-29 ENCOUNTER — OFFICE VISIT (OUTPATIENT)
Dept: INTERNAL MEDICINE CLINIC | Age: 66
End: 2021-01-29
Payer: MEDICARE

## 2021-01-29 VITALS
HEART RATE: 71 BPM | OXYGEN SATURATION: 97 % | HEIGHT: 64 IN | RESPIRATION RATE: 16 BRPM | BODY MASS INDEX: 26.46 KG/M2 | DIASTOLIC BLOOD PRESSURE: 82 MMHG | TEMPERATURE: 97.6 F | SYSTOLIC BLOOD PRESSURE: 132 MMHG | WEIGHT: 155 LBS

## 2021-01-29 DIAGNOSIS — F51.01 PRIMARY INSOMNIA: ICD-10-CM

## 2021-01-29 DIAGNOSIS — E78.2 MIXED HYPERLIPIDEMIA: ICD-10-CM

## 2021-01-29 DIAGNOSIS — Z00.00 WELCOME TO MEDICARE PREVENTIVE VISIT: Primary | ICD-10-CM

## 2021-01-29 DIAGNOSIS — I10 ESSENTIAL HYPERTENSION: ICD-10-CM

## 2021-01-29 DIAGNOSIS — K21.9 GASTROESOPHAGEAL REFLUX DISEASE WITHOUT ESOPHAGITIS: ICD-10-CM

## 2021-01-29 DIAGNOSIS — R73.01 FASTING HYPERGLYCEMIA: ICD-10-CM

## 2021-01-29 PROCEDURE — G8510 SCR DEP NEG, NO PLAN REQD: HCPCS | Performed by: INTERNAL MEDICINE

## 2021-01-29 PROCEDURE — G0403 EKG FOR INITIAL PREVENT EXAM: HCPCS | Performed by: INTERNAL MEDICINE

## 2021-01-29 PROCEDURE — 1090F PRES/ABSN URINE INCON ASSESS: CPT | Performed by: INTERNAL MEDICINE

## 2021-01-29 PROCEDURE — G8754 DIAS BP LESS 90: HCPCS | Performed by: INTERNAL MEDICINE

## 2021-01-29 PROCEDURE — G8752 SYS BP LESS 140: HCPCS | Performed by: INTERNAL MEDICINE

## 2021-01-29 PROCEDURE — G8427 DOCREV CUR MEDS BY ELIG CLIN: HCPCS | Performed by: INTERNAL MEDICINE

## 2021-01-29 PROCEDURE — G0402 INITIAL PREVENTIVE EXAM: HCPCS | Performed by: INTERNAL MEDICINE

## 2021-01-29 PROCEDURE — G8419 CALC BMI OUT NRM PARAM NOF/U: HCPCS | Performed by: INTERNAL MEDICINE

## 2021-01-29 PROCEDURE — G8536 NO DOC ELDER MAL SCRN: HCPCS | Performed by: INTERNAL MEDICINE

## 2021-01-29 PROCEDURE — 3017F COLORECTAL CA SCREEN DOC REV: CPT | Performed by: INTERNAL MEDICINE

## 2021-01-29 PROCEDURE — 1101F PT FALLS ASSESS-DOCD LE1/YR: CPT | Performed by: INTERNAL MEDICINE

## 2021-01-29 PROCEDURE — G8399 PT W/DXA RESULTS DOCUMENT: HCPCS | Performed by: INTERNAL MEDICINE

## 2021-01-29 PROCEDURE — 99214 OFFICE O/P EST MOD 30 MIN: CPT | Performed by: INTERNAL MEDICINE

## 2021-01-29 PROCEDURE — G9899 SCRN MAM PERF RSLTS DOC: HCPCS | Performed by: INTERNAL MEDICINE

## 2021-01-29 RX ORDER — CLOBETASOL PROPIONATE 0.5 MG/G
CREAM TOPICAL
Qty: 60 G | Refills: 1 | Status: SHIPPED | OUTPATIENT
Start: 2021-01-29 | End: 2021-08-04 | Stop reason: SDUPTHER

## 2021-01-29 RX ORDER — MULTIVIT-MIN/FOLIC AC/COLLAGEN 0.2MG-25MG
2.5-1 TABLET,CHEWABLE ORAL AS NEEDED
COMMUNITY
Start: 2021-01-29

## 2021-01-29 NOTE — PROGRESS NOTES
This is a \"Welcome to [employer]"  Initial Preventive Physical Examination (IPPE) providing Personalized Prevention Plan Services (Performed in the first 12 months of enrollment)    I have reviewed the patient's medical history in detail and updated the computerized patient record. As well as a follow up of her health issues. BP has been controlled at home. Some urine urgency off and on. ROS - otherwise negative. Physical Examination: General appearance - alert, well appearing, and in no distress  Ears - bilateral TM's and external ear canals normal  Nose - normal and patent, no erythema, discharge or polyps  Mouth - mucous membranes moist, pharynx normal without lesions  Neck - supple, no significant adenopathy  Lymphatics - no palpable lymphadenopathy, no hepatosplenomegaly  Chest - clear to auscultation, no wheezes, rales or rhonchi, symmetric air entry  Heart - normal rate, regular rhythm, normal S1, S2, no murmurs, rubs, clicks or gallops  Abdomen - soft, nontender, nondistended, no masses or organomegaly  Back exam - full range of motion, no tenderness, palpable spasm or pain on motion  Neurological - alert, oriented, normal speech, no focal findings or movement disorder noted  Musculoskeletal - no joint tenderness, deformity or swelling  Extremities - peripheral pulses normal, no pedal edema, no clubbing or cyanosis      Depression Risk Screen     3 most recent PHQ Screens 1/29/2021   Little interest or pleasure in doing things Not at all   Feeling down, depressed, irritable, or hopeless Not at all   Total Score PHQ 2 0       Alcohol Risk Screen    Do you average more than 1 drink per night or more than 7 drinks a week:  No    On any one occasion in the past three months have you have had more than 3 drinks containing alcohol:  No          Functional Ability and Level of Safety    Diet: No special diet      Hearing: Hearing is good. Vision Screening:  Vision is good.   No exam data present Activities of Daily Living: The home contains: no safety equipment. Patient does total self care      Ambulation: with no difficulty      Exercise level: moderately active     Fall Risk Screen:  Fall Risk Assessment, last 12 mths 1/29/2021   Able to walk? Yes   Fall in past 12 months? 0   Do you feel unsteady? 0   Are you worried about falling 0      Abuse Screen:  Patient is not abused       Screening EKG   EKG order placed: Yes    End of Life Planning   Advanced care planning directives were discussed with the patient and /or family/caregiver. Assessment/Plan   Education and counseling provided:  Are appropriate based on today's review and evaluation  End-of-Life planning (with patient's consent)  Diabetes screening test    Diagnoses and all orders for this visit:    1. Welcome to Medicare preventive visit  -     AMB POC EKG ROUTINE W/ 12 LEADS, SCREEN ()  -     REFERRAL TO [facility] CLINICAL SPECIALIST    2. Mixed hyperlipidemia - diet controlled. Check labs for this. -     METABOLIC PANEL, COMPREHENSIVE; Future  -     CBC WITH AUTOMATED DIFF; Future  -     LIPID PANEL; Future  -     TSH 3RD GENERATION; Future    3. Essential hypertension - check BP at home and report if high.  -     CBC WITH AUTOMATED DIFF; Future  -     LIPID PANEL; Future  -     TSH 3RD GENERATION; Future  -     URINALYSIS W/ REFLEX CULTURE; Future    4. Gastroesophageal reflux disease without esophagitis - stable off meds    5. Fasting hyperglycemia - repeat A1C.   -     HEMOGLOBIN A1C WITH EAG; Future  6. Bladder issues - referral to Dr. Shannon Gonzales  6.  Primary insomnia    Other orders  -     clobetasoL (TEMOVATE) 0.05 % topical cream; prn        Health Maintenance Due     Health Maintenance Due   Topic Date Due    GLAUCOMA SCREENING Q2Y  06/28/2020    Pneumococcal 65+ years (1 of 1 - PPSV23) 06/28/2020       Patient Care Team   Patient Care Team:  Yelena Spears MD as PCP - General (Internal Medicine)  Yelena Spears MD as PCP [facility] Empaneled Provider  Hue Santos MD (Ophthalmology)  Fly Wilkinson MD (Cardiology)    History     Past Medical History:   Diagnosis Date    Fracture 2016    Right foot fx (pt turned foot while stepping off curb)    GERD (gastroesophageal reflux disease)     HX    Headache(784.0) 12/17/2009    Mixed hyperlipidemia 12/17/2009    Rosacea 2017      Past Surgical History:   Procedure Laterality Date    HX BLADDER SUSPENSION  5/2014    HX DILATION AND CURETTAGE      HX TUBAL LIGATION      TITA US BX BREAST LT 1ST LESION W/CLIP AND SPECIMEN Left 12/09/2016    benign     Current Outpatient Medications   Medication Sig Dispense Refill    clobetasoL (TEMOVATE) 0.05 % topical cream prn 60 g 1    melatonin 5 mg chew Take 2.5-10 mg by mouth nightly.  carvediloL (COREG) 12.5 mg tablet TAKE 1 TABLET BY MOUTH TWICE A DAY WITH MEALS 60 Tab 1     Allergies   Allergen Reactions    Morphine Nausea Only    Zithromax [Azithromycin] Nausea Only       Family History   Problem Relation Age of Onset    Heart Disease Mother         MI   [de-identified] Cancer Father         Unknown primary   [de-identified] Psoriasis Daughter     Anesth Problems Neg Hx      Social History     Tobacco Use    Smoking status: Never Smoker    Smokeless tobacco: Never Used   Substance Use Topics    Alcohol use:  Yes     Alcohol/week: 1.7 standard drinks     Types: 2 Glasses of wine per week     Frequency: Monthly or less     Drinks per session: 1 or 2     Binge frequency: Never     Comment: WEEKLY

## 2021-01-29 NOTE — PATIENT INSTRUCTIONS
Medicare Wellness Visit, Female The best way to live healthy is to have a lifestyle where you eat a well-balanced diet, exercise regularly, limit alcohol use, and quit all forms of tobacco/nicotine, if applicable. Regular preventive services are another way to keep healthy. Preventive services (vaccines, screening tests, monitoring & exams) can help personalize your care plan, which helps you manage your own care. Screening tests can find health problems at the earliest stages, when they are easiest to treat. [facility] follows the current, evidence-based guidelines published by the [facility] ([facility]) when recommending preventive services for our patients. Because we follow these guidelines, sometimes recommendations change over time as research supports it. (For example, mammograms used to be recommended annually. Even though Medicare will still pay for an annual mammogram, the newer guidelines recommend a mammogram every two years for women of average risk). Of course, you and your doctor may decide to screen more often for some diseases, based on your risk and your co-morbidities (chronic disease you are already diagnosed with). Preventive services for you include: - Medicare offers their members a free annual wellness visit, which is time for you and your primary care provider to discuss and plan for your preventive service needs. Take advantage of this benefit every year! 
-All adults over the age of 72 should receive the recommended pneumonia vaccines. Current USPSTF guidelines recommend a series of two vaccines for the best pneumonia protection.  
-All adults should have a flu vaccine yearly and a tetanus vaccine every 10 years.  
-All adults age 48 and older should receive the shingles vaccines (series of two vaccines). -All adults age 38-68 who are overweight should have a diabetes screening test once every three years. -All adults born between [birth year] and 1965 should be screened once for Hepatitis C. 
-Other screening tests and preventive services for persons with diabetes include: an eye exam to screen for diabetic retinopathy, a kidney function test, a foot exam, and stricter control over your cholesterol.  
-Cardiovascular screening for adults with routine risk involves an electrocardiogram (ECG) at intervals determined by your doctor.  
-Colorectal cancer screenings should be done for adults age 54-65 with no increased risk factors for colorectal cancer. There are a number of acceptable methods of screening for this type of cancer. Each test has its own benefits and drawbacks. Discuss with your doctor what is most appropriate for you during your annual wellness visit. The different tests include: colonoscopy (considered the best screening method), a fecal occult blood test, a fecal DNA test, and sigmoidoscopy. 
 
-A bone mass density test is recommended when a woman turns 65 to screen for osteoporosis. This test is only recommended one time, as a screening. Some providers will use this same test as a disease monitoring tool if you already have osteoporosis. -Breast cancer screenings are recommended every other year for women of normal risk, age 54-69. 
-Cervical cancer screenings for women over age 72 are only recommended with certain risk factors. Here is a list of your current Health Maintenance items (your personalized list of preventive services) with a due date: 
Health Maintenance Due Topic Date Due  Glaucoma Screening   06/28/2020  Pneumococcal Vaccine (1 of 1 - PPSV23) 06/28/2020

## 2021-01-31 RX ORDER — CARVEDILOL 12.5 MG/1
TABLET ORAL
Qty: 60 TAB | Refills: 1 | Status: SHIPPED | OUTPATIENT
Start: 2021-01-31 | End: 2021-02-24

## 2021-02-01 ENCOUNTER — TELEPHONE (OUTPATIENT)
Dept: OTHER | Age: 66
End: 2021-02-01

## 2021-02-01 NOTE — TELEPHONE ENCOUNTER
ACP Specialist contacted Mrs. Ellie Tinoco to follow-up on ACP referral.  Patient confirmed her emergency contact as her . Mrs. Ellie Tinoco was open receiving an overview about ACP. Patient agreed to also receive an email with relevant ACP information. The following below documents were sent to patient. ACP Specialist will follow-up with patient next week. Ms. Ellie Tinoco,  Thank you for taking the time to chat with me this morning about Advance Care Planning. Attached are the following documents:     - [facility]  - How to Choose a [address] [place] [address] Directive)    Please contact me at [phone] if you have questions or need assistance completing the form.     Anthony Overton

## 2021-02-05 DIAGNOSIS — R39.9 UTI SYMPTOMS: Primary | ICD-10-CM

## 2021-02-08 ENCOUNTER — TELEPHONE (OUTPATIENT)
Dept: OTHER | Age: 66
End: 2021-02-08

## 2021-02-08 NOTE — TELEPHONE ENCOUNTER
ACP Specialist attempted to contact patient to follow-up on information emailed to her regarding Advance Care Planning. No answer. VM left.

## 2021-02-24 ENCOUNTER — IMMUNIZATION (OUTPATIENT)
Dept: FAMILY MEDICINE CLINIC | Age: 66
End: 2021-02-24
Payer: MEDICARE

## 2021-02-24 DIAGNOSIS — Z23 ENCOUNTER FOR IMMUNIZATION: Primary | ICD-10-CM

## 2021-02-24 PROCEDURE — 91301 COVID-19, MRNA, LNP-S, PF, 100MCG/0.5ML DOSE(MODERNA): CPT | Performed by: FAMILY MEDICINE

## 2021-02-24 PROCEDURE — 0012A COVID-19, MRNA, LNP-S, PF, 100MCG/0.5ML DOSE(MODERNA): CPT | Performed by: FAMILY MEDICINE

## 2021-02-24 RX ORDER — CARVEDILOL 12.5 MG/1
TABLET ORAL
Qty: 60 TAB | Refills: 1 | Status: SHIPPED | OUTPATIENT
Start: 2021-02-24 | End: 2021-03-21

## 2021-03-09 LAB
Lab: NORMAL
REFERENCE LAB,REFLB: NORMAL
TEST DESCRIPTION:,ATST: NORMAL

## 2021-03-21 RX ORDER — CARVEDILOL 12.5 MG/1
TABLET ORAL
Qty: 60 TAB | Refills: 1 | Status: SHIPPED | OUTPATIENT
Start: 2021-03-21 | End: 2021-04-02 | Stop reason: DRUGHIGH

## 2021-04-01 ENCOUNTER — PATIENT MESSAGE (OUTPATIENT)
Dept: INTERNAL MEDICINE CLINIC | Age: 66
End: 2021-04-01

## 2021-04-02 RX ORDER — CARVEDILOL 25 MG/1
25 TABLET ORAL 2 TIMES DAILY WITH MEALS
Qty: 60 TAB | Refills: 0 | Status: SHIPPED | OUTPATIENT
Start: 2021-04-02 | End: 2021-04-28

## 2021-04-28 RX ORDER — CARVEDILOL 25 MG/1
TABLET ORAL
Qty: 60 TAB | Refills: 0 | Status: SHIPPED | OUTPATIENT
Start: 2021-04-28 | End: 2021-05-19

## 2021-05-19 RX ORDER — CARVEDILOL 25 MG/1
TABLET ORAL
Qty: 60 TABLET | Refills: 0 | Status: SHIPPED | OUTPATIENT
Start: 2021-05-19 | End: 2021-06-13

## 2021-05-28 ENCOUNTER — OFFICE VISIT (OUTPATIENT)
Dept: INTERNAL MEDICINE CLINIC | Age: 66
End: 2021-05-28
Payer: MEDICARE

## 2021-05-28 ENCOUNTER — TELEPHONE (OUTPATIENT)
Dept: INTERNAL MEDICINE CLINIC | Age: 66
End: 2021-05-28

## 2021-05-28 ENCOUNTER — HOSPITAL ENCOUNTER (OUTPATIENT)
Dept: ULTRASOUND IMAGING | Age: 66
Discharge: HOME | End: 2021-05-28

## 2021-05-28 VITALS
SYSTOLIC BLOOD PRESSURE: 132 MMHG | BODY MASS INDEX: 25.95 KG/M2 | DIASTOLIC BLOOD PRESSURE: 91 MMHG | OXYGEN SATURATION: 95 % | HEIGHT: 64 IN | RESPIRATION RATE: 16 BRPM | TEMPERATURE: 97.8 F | HEART RATE: 88 BPM | WEIGHT: 152 LBS

## 2021-05-28 DIAGNOSIS — M70.61 TROCHANTERIC BURSITIS OF RIGHT HIP: Primary | ICD-10-CM

## 2021-05-28 DIAGNOSIS — M79.661 RIGHT CALF PAIN: Primary | ICD-10-CM

## 2021-05-28 DIAGNOSIS — M79.661 RIGHT CALF PAIN: ICD-10-CM

## 2021-05-28 DIAGNOSIS — M70.50 PES ANSERINE BURSITIS: ICD-10-CM

## 2021-05-28 PROCEDURE — G9899 SCRN MAM PERF RSLTS DOC: HCPCS | Performed by: INTERNAL MEDICINE

## 2021-05-28 PROCEDURE — 99213 OFFICE O/P EST LOW 20 MIN: CPT | Performed by: INTERNAL MEDICINE

## 2021-05-28 PROCEDURE — 3017F COLORECTAL CA SCREEN DOC REV: CPT | Performed by: INTERNAL MEDICINE

## 2021-05-28 PROCEDURE — G8755 DIAS BP > OR = 90: HCPCS | Performed by: INTERNAL MEDICINE

## 2021-05-28 PROCEDURE — G8510 SCR DEP NEG, NO PLAN REQD: HCPCS | Performed by: INTERNAL MEDICINE

## 2021-05-28 PROCEDURE — 1101F PT FALLS ASSESS-DOCD LE1/YR: CPT | Performed by: INTERNAL MEDICINE

## 2021-05-28 PROCEDURE — G8427 DOCREV CUR MEDS BY ELIG CLIN: HCPCS | Performed by: INTERNAL MEDICINE

## 2021-05-28 PROCEDURE — 93971 EXTREMITY STUDY: CPT | Performed by: INTERNAL MEDICINE

## 2021-05-28 PROCEDURE — G8536 NO DOC ELDER MAL SCRN: HCPCS | Performed by: INTERNAL MEDICINE

## 2021-05-28 PROCEDURE — G8752 SYS BP LESS 140: HCPCS | Performed by: INTERNAL MEDICINE

## 2021-05-28 PROCEDURE — G8399 PT W/DXA RESULTS DOCUMENT: HCPCS | Performed by: INTERNAL MEDICINE

## 2021-05-28 PROCEDURE — G8419 CALC BMI OUT NRM PARAM NOF/U: HCPCS | Performed by: INTERNAL MEDICINE

## 2021-05-28 PROCEDURE — 1090F PRES/ABSN URINE INCON ASSESS: CPT | Performed by: INTERNAL MEDICINE

## 2021-05-28 RX ORDER — DICLOFENAC SODIUM 10 MG/G
GEL TOPICAL 4 TIMES DAILY
COMMUNITY
Start: 2021-05-28

## 2021-05-28 NOTE — TELEPHONE ENCOUNTER
Chief Complaint   Patient presents with    Appointment    Leg Pain     Attempted to contact pt to discuss her reason for visit this afternoon. Wanted to check s/sx for blood clot to see if we could potentially schedule a doppler before her appointment this afternoon. Attempted to contact patient, unsuccessful.    -Left message to return call.

## 2021-05-28 NOTE — PATIENT INSTRUCTIONS
Hip Bursitis: Care Instructions Your Care Instructions Bursitis is inflammation of the bursa. A bursa is a small sac of fluid that cushions a joint and helps it move easily. A bursa sits between a bone in the hip and the muscles and tendons in the thigh and buttock. Injury or overuse of the hip can cause bursitis. Activities that can lead to bursitis include twisting and rapid joint movement. Bursitis can cause hip pain. Bursitis usually gets better if you avoid the activity that caused it. If pain lasts or gets worse despite home treatment, your doctor may draw fluid from the bursa through a needle. This may relieve your pain and help your doctor know if you have an infection. If so, your doctor will prescribe antibiotics. If you have inflammation only, you may get a corticosteroid shot to reduce swelling and pain. Sometimes surgery is needed to drain or remove the bursa. Follow-up care is a key part of your treatment and safety. Be sure to make and go to all appointments, and call your doctor if you are having problems. It's also a good idea to know your test results and keep a list of the medicines you take. How can you care for yourself at home? · Put ice or a cold pack on your hip for 10 to 20 minutes at a time. Put a thin cloth between the ice and your skin. · After 3 days of using ice, you may use heat on your hip. You can use a hot water bottle, a heating pad set on low, or a warm, moist towel. · Rest your hip. Stop any activities that cause pain. Switch to activities that do not stress your hip. · Take your medicines exactly as prescribed. Call your doctor if you think you are having a problem with your medicine. · Ask your doctor if you can take an over-the-counter pain medicine, such as acetaminophen (Tylenol), ibuprofen (Advil, Motrin), or naproxen (Aleve). Be safe with medicines. Read and follow all instructions on the label.  
· To prevent stiffness, gently move the hip joint as much as you can without pain every day. As the pain gets better, keep doing range-of-motion exercises. Ask your doctor for exercises that will make the muscles around the hip joint stronger. Do these as directed. · You can slowly return to the activity that caused the pain, but do it with less effort until you can do it without pain or swelling. Be sure to warm up before and stretch after you do the activity. When should you call for help? Call your doctor now or seek immediate medical care if: 
  · You have a fever.  
  · You have increased swelling or redness in your hip.  
  · You cannot use your hip, or the pain in your hip gets worse. Watch closely for changes in your health, and be sure to contact your doctor if: 
  · You have pain for 2 weeks or longer despite home treatment. Where can you learn more? Go to http://www.gray.com/ Enter W982 in the search box to learn more about \"Hip Bursitis: Care Instructions. \" Current as of: November 16, 2020               Content Version: 12.8 © 2006-2021 [employer]. Care instructions adapted under license by [employer] (which disclaims liability or warranty for this information). If you have questions about a medical condition or this instruction, always ask your healthcare professional. [place] [address] any warranty or liability for your use of this information. Hip Bursitis: Exercises Introduction Here are some examples of exercises for you to try. The exercises may be suggested for a condition or for rehabilitation. Start each exercise slowly. Ease off the exercises if you start to have pain. You will be told when to start these exercises and which ones will work best for you. How to do the exercises Hip rotator stretch 1. Lie on your back with both knees bent and your feet flat on the floor. 2. Put the ankle of your affected leg on your opposite thigh near your knee.  
3. Use your hand to gently push your knee away from your body until you feel a gentle stretch around your hip. 4. Hold the stretch for 15 to 30 seconds. 5. Repeat 2 to 4 times. 6. Repeat steps 1 through 5, but this time use your hand to gently pull your knee toward your opposite shoulder. Iliotibial band stretch 1. Lean sideways against a wall. If you are not steady on your feet, hold on to a chair or counter. 2. Stand on the leg with the affected hip, with that leg close to the wall. Then cross your other leg in front of it. 3. Let your affected hip drop out to the side of your body and against wall. Then lean away from your affected hip until you feel a stretch. 4. Hold the stretch for 15 to 30 seconds. 5. Repeat 2 to 4 times. Straight-leg raises to the outside 1. Lie on your side, with your affected hip on top. 2. Tighten the front thigh muscles of your top leg to keep your knee straight. 3. Keep your hip and your leg straight in line with the rest of your body, and keep your knee pointing forward. Do not drop your hip back. 4. Lift your top leg straight up toward the ceiling, about 12 inches off the floor. Hold for about 6 seconds, then slowly lower your leg. 5. Repeat 8 to 12 times. Clamshell 1. Lie on your side, with your affected hip on top and your head propped on a pillow. Keep your feet and knees together and your knees bent. 2. Raise your top knee, but keep your feet together. Do not let your hips roll back. Your legs should open up like a clamshell. 3. Hold for 6 seconds. 4. Slowly lower your knee back down. Rest for 10 seconds. 5. Repeat 8 to 12 times. Follow-up care is a key part of your treatment and safety. Be sure to make and go to all appointments, and call your doctor if you are having problems. It's also a good idea to know your test results and keep a list of the medicines you take. Where can you learn more? Go to http://www.gray.com/ Enter I873 in the search box to learn more about \"Hip Bursitis: Exercises. \" Current as of: November 16, 2020               Content Version: 12.8 © 2006-2021 [employer]. Care instructions adapted under license by [employer] (which disclaims liability or warranty for this information). If you have questions about a medical condition or this instruction, always ask your healthcare professional. [place] [address] any warranty or liability for your use of this information. Knee (Pes Anserine) Bursitis: Exercises Introduction Here are some examples of exercises for you to try. The exercises may be suggested for a condition or for rehabilitation. Start each exercise slowly. Ease off the exercises if you start to have pain. You will be told when to start these exercises and which ones will work best for you. How to do the exercises Heel slide 1. Lie on your back with your affected knee straight. Your good knee should be bent. 2. Bend your affected knee by sliding your heel across the floor and toward your buttock until you feel a gentle stretch in your knee. 3. Hold for about 6 seconds, and then slowly straighten your knee. 4. Repeat 8 to 12 times. NeoVista 1. Sit with your affected leg straight and supported on the floor or a firm bed. Place a small, rolled-up towel under your affected knee. Your other leg should be bent, with that foot flat on the floor. 2. Tighten the thigh muscles of your affected leg by pressing the back of your knee down into the towel. 3. Hold for about 6 seconds, then rest for up to 10 seconds. 4. Repeat 8 to 12 times. Straight-leg raises to the front 1. Lie on your back with your good knee bent so that your foot rests flat on the floor. Your affected leg should be straight. Make sure that your low back has a normal curve.  You should be able to slip your hand in between the floor and the small of your back, with your palm touching the floor and your back touching the back of your hand. 2. Tighten the thigh muscles in your affected leg by pressing the back of your knee flat down to the floor. Hold your knee straight. 3. Keeping the thigh muscles tight and your leg straight, lift your affected leg up so that your heel is about 12 inches off the floor. 4. Hold for about 6 seconds, then lower slowly. Rest for up to 10 seconds between repetitions. 5. Repeat 8 to 12 times. Follow-up care is a key part of your treatment and safety. Be sure to make and go to all appointments, and call your doctor if you are having problems. It's also a good idea to know your test results and keep a list of the medicines you take. Where can you learn more? Go to http://www.gray.com/ Enter S958 in the search box to learn more about \"Knee (Pes Anserine) Bursitis: Exercises. \" Current as of: November 16, 2020               Content Version: 12.8 © 2006-2021 [employer]. Care instructions adapted under license by [employer] (which disclaims liability or warranty for this information). If you have questions about a medical condition or this instruction, always ask your healthcare professional. [address] any warranty or liability for your use of this information.

## 2021-05-28 NOTE — TELEPHONE ENCOUNTER
Patient returned call. Reports dull aching pain right calf/shin intermittent x 3 weeks. Denies redness, warmth, or swelling. Reviewed w/ PCP and would like to obtian stat doppler to rule out DVT prior to appointment his afternoon if possible. Pt lives in [facility] will try for [facility] or [place] but patient states she can go anywhere.

## 2021-05-29 NOTE — PROGRESS NOTES
HPI:  Ketty Armijo is a 72y.o. year old female who is here for a 1 month history of pain in the right knee and right hip. The pain seems to be off and on and worsened with some walking. No numbness, tingling, weakness. She does have some increased discomfort after long drives. Past Medical History:   Diagnosis Date    Fracture 2016    Right foot fx (pt turned foot while stepping off curb)    GERD (gastroesophageal reflux disease)     HX    Headache(784.0) 12/17/2009    Mixed hyperlipidemia 12/17/2009    Rosacea 2017       Past Surgical History:   Procedure Laterality Date    HX BLADDER SUSPENSION  5/2014    HX DILATION AND CURETTAGE      HX TUBAL LIGATION      TITA US BX BREAST LT 1ST LESION W/CLIP AND SPECIMEN Left 12/09/2016    benign       Prior to Admission medications    Medication Sig Start Date End Date Taking? Authorizing Provider   diclofenac (VOLTAREN) 1 % gel Apply  to affected area four (4) times daily. 5/28/21  Yes Nils Eldridge MD   carvediloL (COREG) 25 mg tablet TAKE 1 TABLET BY MOUTH TWICE A DAY WITH MEALS 5/19/21  Yes Ibeth Eli III, MD   clobetasoL (TEMOVATE) 0.05 % topical cream prn 1/29/21  Yes Nils Eldridge MD   melatonin 5 mg chew Take 2.5-10 mg by mouth nightly. 1/29/21  Yes Nils Eldridge MD       Social History     Socioeconomic History    Marital status:      Spouse name: Not on file    Number of children: Not on file    Years of education: Not on file    Highest education level: Not on file   Occupational History    Not on file   Tobacco Use    Smoking status: Never Smoker    Smokeless tobacco: Never Used   Substance and Sexual Activity    Alcohol use:  Yes     Alcohol/week: 1.7 standard drinks     Types: 2 Glasses of wine per week     Comment: WEEKLY    Drug use: No    Sexual activity: Yes     Partners: Male   Other Topics Concern    Not on file   Social History Narrative    Not on file     Social Determinants of Health Financial Resource Strain:     Difficulty of Paying Living Expenses:    Food Insecurity:     Worried About Running Out of Food in the Last Year:     [address] in the Last Year:    Transportation Needs:     Lack of Transportation (Medical):  Lack of Transportation (Non-Medical):    Physical Activity:     Days of Exercise per Week:     Minutes of Exercise per Session:    Stress:     Feeling of Stress :    Social Connections:     Frequency of Communication with Friends and Family:     Frequency of Social Gatherings with Friends and Family:     Attends Jewish Services:     Active Member of Clubs or Organizations:     Attends Club or Organization Meetings:     Marital Status:    Intimate Partner Violence:     Fear of Current or Ex-Partner:     Emotionally Abused:     Physically Abused:     Sexually Abused:           ROS  Per HPI    Visit Vitals  BP (!) 132/91   Pulse 88   Temp 97.8 °F (36.6 °C) (Temporal)   Resp 16   Ht 5' 4\" (1.626 m)   Wt 152 lb (68.9 kg)   SpO2 95%   BMI 26.09 kg/m²         Physical Exam   Physical Examination: General appearance - alert, well appearing, and in no distress  Chest - clear to auscultation, no wheezes, rales or rhonchi, symmetric air entry  Heart - normal rate and regular rhythm  Back exam - full range of motion, no tenderness, palpable spasm or pain on motion, negative straight-leg raise bilaterally at 45 degrees  Neurological - alert, oriented, normal speech, no focal findings or movement disorder noted  Musculoskeletal - abnormal exam of right hip with tenderness over the burs as well as over the right knee pes anserine bursa - no calf tenderness and negative Hohmann's sign. Extremities - peripheral pulses normal, no pedal edema, no clubbing or cyanosis      Assessment/Plan:  Diagnoses and all orders for this visit:    1. Trochanteric bursitis of right hip - will treat with stretches and ice and consider PT if needed.     2. Pes anserine bursitis - stretches and ice as well as voltaren gel. Use PT if needed. Ultrasound negative for clot done ear;lier today. Advised her to call back or return to office if symptoms worsen/change/persist.  Discussed expected course/resolution/complications of diagnosis in detail with patient. Medication risks/benefits/costs/interactions/alternatives discussed with patient. She was given an after visit summary which includes diagnoses, current medications, & vitals. She expressed understanding with the diagnosis and plan.

## 2021-06-13 RX ORDER — CARVEDILOL 25 MG/1
TABLET ORAL
Qty: 60 TABLET | Refills: 0 | Status: SHIPPED | OUTPATIENT
Start: 2021-06-13 | End: 2021-07-08

## 2021-07-08 RX ORDER — CARVEDILOL 25 MG/1
TABLET ORAL
Qty: 60 TABLET | Refills: 0 | Status: SHIPPED | OUTPATIENT
Start: 2021-07-08 | End: 2021-07-30

## 2021-07-30 RX ORDER — CARVEDILOL 25 MG/1
TABLET ORAL
Qty: 60 TABLET | Refills: 0 | Status: SHIPPED | OUTPATIENT
Start: 2021-07-30 | End: 2021-08-24

## 2021-08-04 ENCOUNTER — DOCUMENTATION ONLY (OUTPATIENT)
Dept: CASE MANAGEMENT | Age: 66
End: 2021-08-04

## 2021-08-04 RX ORDER — CLOBETASOL PROPIONATE 0.5 MG/G
CREAM TOPICAL
Qty: 60 G | Refills: 1 | Status: SHIPPED | OUTPATIENT
Start: 2021-08-04 | End: 2021-10-25

## 2021-08-04 NOTE — ACP (ADVANCE CARE PLANNING)
Received signed/witnessed AMD from pt to be scanned into her chart. Will forward to HIM. Updated Healthcare medical decision maker information.   Mabel Dey LCSW

## 2021-08-24 RX ORDER — CARVEDILOL 25 MG/1
TABLET ORAL
Qty: 60 TABLET | Refills: 0 | Status: SHIPPED | OUTPATIENT
Start: 2021-08-24 | End: 2021-09-21

## 2021-09-21 RX ORDER — CARVEDILOL 25 MG/1
TABLET ORAL
Qty: 60 TABLET | Refills: 0 | Status: SHIPPED | OUTPATIENT
Start: 2021-09-21 | End: 2021-10-15

## 2021-10-15 RX ORDER — CARVEDILOL 25 MG/1
TABLET ORAL
Qty: 60 TABLET | Refills: 0 | Status: SHIPPED | OUTPATIENT
Start: 2021-10-15 | End: 2021-11-10

## 2021-10-25 RX ORDER — CLOBETASOL PROPIONATE 0.5 MG/G
CREAM TOPICAL
Qty: 60 G | Refills: 1 | Status: SHIPPED | OUTPATIENT
Start: 2021-10-25 | End: 2021-12-17

## 2021-11-10 RX ORDER — CARVEDILOL 25 MG/1
TABLET ORAL
Qty: 60 TABLET | Refills: 0 | Status: SHIPPED | OUTPATIENT
Start: 2021-11-10 | End: 2021-12-03

## 2021-11-30 ENCOUNTER — DOCUMENTATION ONLY (OUTPATIENT)
Dept: CARDIOLOGY | Age: 66
End: 2021-11-30

## 2021-11-30 NOTE — PROGRESS NOTES
PCP: Dr. Franca Purcell    HPI: Joslyn Zuniga, a 72y.o. year-old who presents for follow up regarding her HTN. Hasn't checked in a month, usually 130s/mid 80s. No symptoms of high BP (headache, fatigue). She denies chest pain, shortness of breath. Limited more by joints (knees/back). Has some feelings of palpitations/lightheadedness, not new. This occurs a few times a month and is self limited. Not having any more epigastric pain  No dyspnea with exertion, no PND    Just finished building a house in [facility] with 3 stories   Not having any palpitations recently   Having labs again at visit with Dr. Maral Goldstein   She is not exercising much   Tingling in right hand got much better after carpal tunnel surgery     She has been out walking and trying to exercise some. 30-45 minutes each time. A few times a week with some incline. Encouraged her to add in some strength training. Review of cad risk factors an healthy lifestyle changes to focus on  Review of stress and bp and heart health     Assessment/Plan:  1. Dyspnea - none currently    2. HTN- well controlled on coreg 12.5mg BId  3. GERD - on PPI  -hx of hiatal hernia and extensive diverticulosis on CT in the past   4. Chest discomfort - none currently    5. Palpitations - well controlled on bblocker       Stress echo 2020 - walked 5:30, 110% PMHR, no ischemia  Ca score zero   Stress Echo  - walked 9 min, 108% PMHR, no ischemia      Fam Hx: Mom with HTN, smoker,  of MI at 78, had CVA in her 76s, brothers with HTN  Soc hx: no tob use, drinks 1-2 alcoholic drinks/week     She  has a past medical history of Fracture (2016), GERD (gastroesophageal reflux disease), Headache(784.0) (2009), Mixed hyperlipidemia (2009), and Rosacea (2017).      Cardiovascular ROS: no chest pain or palpitations   Respiratory ROS: no dyspnea with exertion, positive for cough    Neurological ROS: no TIA or stroke symptoms  All other systems negative except as above.

## 2021-12-02 ENCOUNTER — TELEPHONE (OUTPATIENT)
Dept: INTERNAL MEDICINE CLINIC | Age: 66
End: 2021-12-02

## 2021-12-03 RX ORDER — CARVEDILOL 25 MG/1
TABLET ORAL
Qty: 60 TABLET | Refills: 0 | Status: SHIPPED | OUTPATIENT
Start: 2021-12-03 | End: 2021-12-28

## 2021-12-06 ENCOUNTER — TELEPHONE (OUTPATIENT)
Dept: INTERNAL MEDICINE CLINIC | Age: 66
End: 2021-12-06

## 2021-12-06 NOTE — TELEPHONE ENCOUNTER
Called to see if patient had future appointments with [facility] to acquire referral for as Dominic Welch does not backdate for the appointment she had on 11/30/2021

## 2021-12-06 NOTE — TELEPHONE ENCOUNTER
----- Message from Néstor Melvincarolina sent at 12/6/2021  2:01 PM EST -----  Subject: Message to Provider    QUESTIONS  Information for Provider? pt was returning a VM she has an appointment   scheduled for November 29th, 2022  ---------------------------------------------------------------------------  --------------  Amaury MEMBRENO  What is the best way for the office to contact you? OK to leave message on   voicemail  Preferred Call Back Phone Number? [phone]  ---------------------------------------------------------------------------  --------------  SCRIPT ANSWERS  Relationship to Patient?  Self

## 2021-12-17 RX ORDER — CLOBETASOL PROPIONATE 0.5 MG/G
CREAM TOPICAL
Qty: 60 G | Refills: 1 | Status: SHIPPED | OUTPATIENT
Start: 2021-12-17 | End: 2022-02-13

## 2021-12-28 RX ORDER — CARVEDILOL 25 MG/1
TABLET ORAL
Qty: 60 TABLET | Refills: 0 | Status: SHIPPED | OUTPATIENT
Start: 2021-12-28 | End: 2022-01-21

## 2021-12-28 NOTE — TELEPHONE ENCOUNTER
Chief Complaint   Patient presents with    Medication Refill     Last Appointment with Dr. Yonis Palomares:  2021  Future Appointments   Date Time Provider Kathy Saba   2022 10:00 AM Ananda Goetz MD Merged with [facility] YEISON BEATTY [facility]

## 2021-12-29 ENCOUNTER — TRANSCRIBE ORDER (OUTPATIENT)
Dept: SCHEDULING | Age: 66
End: 2021-12-29

## 2021-12-29 DIAGNOSIS — Z12.31 VISIT FOR SCREENING MAMMOGRAM: Primary | ICD-10-CM

## 2022-01-21 RX ORDER — CARVEDILOL 25 MG/1
TABLET ORAL
Qty: 60 TABLET | Refills: 0 | Status: SHIPPED | OUTPATIENT
Start: 2022-01-21 | End: 2022-02-15

## 2022-01-26 ENCOUNTER — HOSPITAL ENCOUNTER (OUTPATIENT)
Dept: MAMMOGRAPHY | Age: 67
Discharge: HOME | End: 2022-01-26
Attending: INTERNAL MEDICINE
Payer: MEDICARE

## 2022-01-26 DIAGNOSIS — Z12.31 VISIT FOR SCREENING MAMMOGRAM: ICD-10-CM

## 2022-01-26 PROCEDURE — 77067 SCR MAMMO BI INCL CAD: CPT

## 2022-01-31 ENCOUNTER — OFFICE VISIT (OUTPATIENT)
Dept: INTERNAL MEDICINE CLINIC | Age: 67
End: 2022-01-31
Payer: MEDICARE

## 2022-01-31 VITALS
RESPIRATION RATE: 14 BRPM | BODY MASS INDEX: 26.29 KG/M2 | SYSTOLIC BLOOD PRESSURE: 138 MMHG | HEIGHT: 64 IN | DIASTOLIC BLOOD PRESSURE: 88 MMHG | HEART RATE: 70 BPM | TEMPERATURE: 98 F | WEIGHT: 154 LBS | OXYGEN SATURATION: 98 %

## 2022-01-31 DIAGNOSIS — K21.9 GASTROESOPHAGEAL REFLUX DISEASE WITHOUT ESOPHAGITIS: ICD-10-CM

## 2022-01-31 DIAGNOSIS — Z00.00 MEDICARE ANNUAL WELLNESS VISIT, SUBSEQUENT: Primary | ICD-10-CM

## 2022-01-31 DIAGNOSIS — I10 ESSENTIAL HYPERTENSION: ICD-10-CM

## 2022-01-31 DIAGNOSIS — R00.2 INTERMITTENT PALPITATIONS: ICD-10-CM

## 2022-01-31 DIAGNOSIS — Z23 ENCOUNTER FOR IMMUNIZATION: ICD-10-CM

## 2022-01-31 DIAGNOSIS — N81.89 PELVIC RELAXATION: ICD-10-CM

## 2022-01-31 DIAGNOSIS — E78.2 MIXED HYPERLIPIDEMIA: ICD-10-CM

## 2022-01-31 DIAGNOSIS — M85.89 OSTEOPENIA OF MULTIPLE SITES: ICD-10-CM

## 2022-01-31 PROCEDURE — 99214 OFFICE O/P EST MOD 30 MIN: CPT | Performed by: INTERNAL MEDICINE

## 2022-01-31 PROCEDURE — G0009 ADMIN PNEUMOCOCCAL VACCINE: HCPCS | Performed by: INTERNAL MEDICINE

## 2022-01-31 PROCEDURE — 90732 PPSV23 VACC 2 YRS+ SUBQ/IM: CPT | Performed by: INTERNAL MEDICINE

## 2022-01-31 PROCEDURE — G0439 PPPS, SUBSEQ VISIT: HCPCS | Performed by: INTERNAL MEDICINE

## 2022-01-31 RX ORDER — TETANUS TOXOID, REDUCED DIPHTHERIA TOXOID AND ACELLULAR PERTUSSIS VACCINE, ADSORBED 5; 2.5; 8; 8; 2.5 [IU]/.5ML; [IU]/.5ML; UG/.5ML; UG/.5ML; UG/.5ML
0.5 SUSPENSION INTRAMUSCULAR ONCE
Qty: 0.5 ML | Refills: 0 | Status: SHIPPED | OUTPATIENT
Start: 2022-01-31 | End: 2022-01-31

## 2022-01-31 NOTE — PROGRESS NOTES
This is the Subsequent Medicare Annual Wellness Exam, performed 12 months or more after the Initial AWV or the last Subsequent AWV    I have reviewed the patient's medical history in detail and updated the computerized patient record. Also for follow-up of her health issues. She is recently had increased issues with some feeling of a difficult time getting a deep breath. No exertional chest pain. She had a stress test in the last couple years that was unremarkable and has seen the cardiologist for follow-up. She has no exertional chest pain. She does feel that she has had increased anxiety associated with family issues. No nausea or vomiting. No palpitations. No change in bowel or bladder habits. ROS - Per HPI. Her BP has been up and down over the last few weeks. Physical Examination: General appearance - alert, well appearing, and in no distress  Ears - bilateral TM's and external ear canals normal  Mouth - mucous membranes moist, pharynx normal without lesions  Neck - supple, no significant adenopathy  Lymphatics - no palpable lymphadenopathy, no hepatosplenomegaly  Chest - clear to auscultation, no wheezes, rales or rhonchi, symmetric air entry  Heart - normal rate, regular rhythm, normal S1, S2, no murmurs, rubs, clicks or gallops  Abdomen - soft, nontender, nondistended, no masses or organomegaly  Neurological - alert, oriented, normal speech, no focal findings or movement disorder noted  Musculoskeletal - no joint tenderness, deformity or swelling  Extremities - peripheral pulses normal, no pedal edema, no clubbing or cyanosis        Assessment/Plan   Education and counseling provided:  Are appropriate based on today's review and evaluation  End-of-Life planning (with patient's consent)  Pneumococcal Vaccine  Diabetes screening test    1. Medicare annual wellness visit, subsequent  2. Mixed hyperlipidemia - LDL goal of 100 - check labs and see if that is stable.    -     LIPID PANEL; Future  -     TSH 3RD GENERATION; Future  3. Pelvic relaxation - per GYN  4. Gastroesophageal reflux disease without esophagitis --stable symptoms. 5. Essential hypertension-blood pressure appears to be well controlled. Will monitor with a new blood pressure cuff at home and let me know readings.  -     METABOLIC PANEL, COMPREHENSIVE; Future  -     CBC WITH AUTOMATED DIFF; Future  -     TSH 3RD GENERATION; Future  6. Intermittent palpitations-appears stable with no recurrence. 7. Encounter for immunization  -     PNEUMOCOCCAL POLYSACCHARIDE VACCINE, 23-VALENT, ADULT OR IMMUNOSUPPRESSED PT DOSE,  8. Osteopenia of multiple sites-repeat bone density as its been 5 years since her last.  -     DEXA BONE DENSITY STUDY AXIAL; Future       Depression Risk Factor Screening     3 most recent PHQ Screens 1/31/2022   Little interest or pleasure in doing things Not at all   Feeling down, depressed, irritable, or hopeless Not at all   Total Score PHQ 2 0       Alcohol & Drug Abuse Risk Screen    Do you average more than 1 drink per night or more than 7 drinks a week:  No    On any one occasion in the past three months have you have had more than 3 drinks containing alcohol:  No          Functional Ability and Level of Safety    Hearing: Hearing is good. Activities of Daily Living: The home contains: no safety equipment. Patient does total self care      Ambulation: with no difficulty     Fall Risk:  Fall Risk Assessment, last 12 mths 1/31/2022   Able to walk? Yes   Fall in past 12 months? 0   Do you feel unsteady? 0   Are you worried about falling 0   Is TUG test greater than 12 seconds? -   Is the gait abnormal? -   Number of falls in past 12 months -   Fall with injury?  -      Abuse Screen:  Patient is not abused       Cognitive Screening    Has your family/caregiver stated any concerns about your memory: no         Health Maintenance Due     Health Maintenance Due   Topic Date Due    Pneumococcal 65+ years (1 of 1 - PPSV23) Never done    DTaP/Tdap/Td series (3 - Td or Tdap) 08/24/2021       Patient Care Team   Patient Care Team:  Edison Solomon MD as PCP - General (Internal Medicine)  Edison Solomon MD as PCP - [facility]  Ponciano Heimlich, MD (Ophthalmology)  Bigg Núñez MD (Cardiology)    History     Patient Active Problem List   Diagnosis Code    Mixed hyperlipidemia E78.2    Headache R51.9    Pelvic relaxation N81.89    Chest pain at rest R07.9    Gastroesophageal reflux disease without esophagitis K21.9    Essential hypertension I10    Intermittent palpitations R00.2    Cataract, nuclear sclerotic, both eyes H25.13     Past Medical History:   Diagnosis Date    Fracture 2016    Right foot fx (pt turned foot while stepping off curb)    GERD (gastroesophageal reflux disease)     HX    Headache(784.0) 12/17/2009    Mixed hyperlipidemia 12/17/2009    Rosacea 2017      Past Surgical History:   Procedure Laterality Date    HX BLADDER SUSPENSION  5/2014    HX DILATION AND CURETTAGE      HX TUBAL LIGATION      TITA US BX BREAST LT 1ST LESION W/CLIP AND SPECIMEN Left 12/09/2016    benign     Current Outpatient Medications   Medication Sig Dispense Refill    diphth,pertus,acell,,tetanus (Boostrix Tdap) 2.5-8-5 Lf-mcg-Lf/0.5mL susp suspension 0.5 mL by IntraMUSCular route once for 1 dose. Indications: vaccination to prevent diphtheria, pertussis and tetanus 0.5 mL 0    carvediloL (COREG) 25 mg tablet TAKE 1 TABLET BY MOUTH TWICE A DAY WITH MEALS 60 Tablet 0    clobetasoL (TEMOVATE) 0.05 % topical cream APPLY TO AFFECTED AREA TWO (2) TIMES DAILY AS NEEDED FOR SKIN IRRITATION. 60 g 1    diclofenac (VOLTAREN) 1 % gel Apply  to affected area four (4) times daily.  melatonin 5 mg chew Take 2.5-10 mg by mouth as needed.        Allergies   Allergen Reactions    Morphine Nausea Only    Zithromax [Azithromycin] Nausea Only       Family History   Problem Relation Age of Onset    Heart Disease Mother         MI    Stroke Mother     Cancer Father         Unknown primary    Psoriasis Daughter     Heart Disease Maternal Aunt     Anesth Problems Neg Hx      Social History     Tobacco Use    Smoking status: Never Smoker    Smokeless tobacco: Never Used   Substance Use Topics    Alcohol use:  Yes     Alcohol/week: 1.7 standard drinks     Types: 2 Glasses of wine per week     Comment: WEEKLY         Osiris De La Rosa MD

## 2022-01-31 NOTE — PATIENT INSTRUCTIONS
Medicare Wellness Visit, Female     The best way to live healthy is to have a lifestyle where you eat a well-balanced diet, exercise regularly, limit alcohol use, and quit all forms of tobacco/nicotine, if applicable. Regular preventive services are another way to keep healthy. Preventive services (vaccines, screening tests, monitoring & exams) can help personalize your care plan, which helps you manage your own care. Screening tests can find health problems at the earliest stages, when they are easiest to treat. [facility] follows the current, evidence-based guidelines published by the [facility] ([facility]) when recommending preventive services for our patients. Because we follow these guidelines, sometimes recommendations change over time as research supports it. (For example, mammograms used to be recommended annually. Even though Medicare will still pay for an annual mammogram, the newer guidelines recommend a mammogram every two years for women of average risk). Of course, you and your doctor may decide to screen more often for some diseases, based on your risk and your co-morbidities (chronic disease you are already diagnosed with). Preventive services for you include:  - Medicare offers their members a free annual wellness visit, which is time for you and your primary care provider to discuss and plan for your preventive service needs. Take advantage of this benefit every year!  -All adults over the age of 72 should receive the recommended pneumonia vaccines. Current USPSTF guidelines recommend a series of two vaccines for the best pneumonia protection.   -All adults should have a flu vaccine yearly and a tetanus vaccine every 10 years.   -All adults age 48 and older should receive the shingles vaccines (series of two vaccines).       -All adults age 38-68 who are overweight should have a diabetes screening test once every three years.   -All adults born between [birth year] and 1965 should be screened once for Hepatitis C.  -Other screening tests and preventive services for persons with diabetes include: an eye exam to screen for diabetic retinopathy, a kidney function test, a foot exam, and stricter control over your cholesterol.   -Cardiovascular screening for adults with routine risk involves an electrocardiogram (ECG) at intervals determined by your doctor.   -Colorectal cancer screenings should be done for adults age 54-65 with no increased risk factors for colorectal cancer. There are a number of acceptable methods of screening for this type of cancer. Each test has its own benefits and drawbacks. Discuss with your doctor what is most appropriate for you during your annual wellness visit. The different tests include: colonoscopy (considered the best screening method), a fecal occult blood test, a fecal DNA test, and sigmoidoscopy.    -A bone mass density test is recommended when a woman turns 65 to screen for osteoporosis. This test is only recommended one time, as a screening. Some providers will use this same test as a disease monitoring tool if you already have osteoporosis. -Breast cancer screenings are recommended every other year for women of normal risk, age 54-69.  -Cervical cancer screenings for women over age 72 are only recommended with certain risk factors. Here is a list of your current Health Maintenance items (your personalized list of preventive services) with a due date:  Health Maintenance Due   Topic Date Due    Pneumococcal Vaccine (1 of 1 - PPSV23) Never done    DTaP/Tdap/Td  (3 - Td or Tdap) 08/24/2021       Vaccine Information Statement    Pneumococcal Polysaccharide Vaccine (PPSV23): What You Need to Know    Many Vaccine Information Statements are available in [place]ian and other languages. See www.immunize.org/vis  Hojas de información sobre vacunas están disponibles en español y en muchos otros idiomas.  Visite www.immunize.org/vis    1. Why get vaccinated? Pneumococcal polysaccharide vaccine (PPSV23) can prevent pneumococcal disease. Pneumococcal disease refers to any illness caused by pneumococcal bacteria. These bacteria can cause many types of illnesses, including pneumonia, which is an infection of the lungs. Pneumococcal bacteria are one of the most common causes of pneumonia. Besides pneumonia, pneumococcal bacteria can also cause:   Ear infections   Sinus infections   Meningitis (infection of the tissue covering the brain and spinal cord)   Bacteremia (bloodstream infection)    Anyone can get pneumococcal disease, but children under 3years of age, people with certain medical conditions, adults 72 years or older, and cigarette smokers are at the highest risk. Most pneumococcal infections are mild. However, some can result in long-term problems, such as brain damage or hearing loss. Meningitis, bacteremia, and pneumonia caused by pneumococcal disease can be fatal.     2. PPSV23     PPSV23 protects against 23 types of bacteria that cause pneumococcal disease. PPSV23 is recommended for:   All adults 72 years or older,   Anyone 2 years or older with certain medical conditions that can lead to an increased risk for pneumococcal disease. Most people need only one dose of PPSV23. A second dose of PPSV23, and another type of pneumococcal vaccine called PCV13, are recommended for certain high-risk groups. Your health care provider can give you more information. People 65 years or older should get a dose of PPSV23 even if they have already gotten one or more doses of the vaccine before they turned 72.    3. Talk with your health care provider    Tell your vaccine provider if the person getting the vaccine:   Has had an allergic reaction after a previous dose of PPSV23, or has any severe, life-threatening allergies.      In some cases, your health care provider may decide to postpone PPSV23 vaccination to a future visit. People with minor illnesses, such as a cold, may be vaccinated. People who are moderately or severely ill should usually wait until they recover before getting PPSV23. Your health care provider can give you more information. 4. Risks of a vaccine reaction     Redness or pain where the shot is given, feeling tired, fever, or muscle aches can happen after PPSV23. People sometimes faint after medical procedures, including vaccination. Tell your provider if you feel dizzy or have vision changes or ringing in the ears. As with any medicine, there is a very remote chance of a vaccine causing a severe allergic reaction, other serious injury, or death. 5. What if there is a serious problem? An allergic reaction could occur after the vaccinated person leaves the clinic. If you see signs of a severe allergic reaction (hives, swelling of the face and throat, difficulty breathing, a fast heartbeat, dizziness, or weakness), call 9-1-1 and get the person to the nearest hospital.    For other signs that concern you, call your health care provider. Adverse reactions should be reported to the Vaccine Adverse Event Reporting System (VAERS). Your health care provider will usually file this report, or you can do it yourself. Visit the VAERS website at www.vaers. hhs.gov or call 5-403.933.8771. VAERS is only for reporting reactions, and VAERS staff do not give medical advice. 6. How can I learn more?  Ask your health care provider.  Call your local or state health department.    Contact the Centers for Disease Control and Prevention (CDC):  - Call 5-768.771.7053 (1-800-CDC-INFO) or  - Visit CDCs website at www.cdc.gov/vaccines    Vaccine Information Statement   PPSV23   10/30/2019    Department of Health and [facility] for Disease Control and Prevention    Office Use Only

## 2022-01-31 NOTE — PROGRESS NOTES
Eric Ba  is a 77 y.o.  female  who present for routine immunizations. Prior to vaccine administration: Consent was obtained. Risks and adverse reactions were discussed. The patient was provided the VIS and they were given an opportunity to ask questions; all questions were addressed. She  denies any symptoms, reactions or allergies that would exclude them from being immunized today. There were no adverse reactions observed post vaccination. Patient was advised to seek medical or call the office with any questions or concerns post vaccination. Patient verbalized understanding.   Becca Reid LPN

## 2022-02-13 RX ORDER — CLOBETASOL PROPIONATE 0.5 MG/G
CREAM TOPICAL
Qty: 60 G | Refills: 1 | Status: SHIPPED | OUTPATIENT
Start: 2022-02-13 | End: 2022-04-13

## 2022-02-15 RX ORDER — CARVEDILOL 25 MG/1
TABLET ORAL
Qty: 60 TABLET | Refills: 5 | Status: SHIPPED | OUTPATIENT
Start: 2022-02-15 | End: 2022-08-11

## 2022-03-16 ENCOUNTER — DOCUMENTATION ONLY (OUTPATIENT)
Dept: CARDIOLOGY | Age: 67
End: 2022-03-16

## 2022-03-16 NOTE — PROGRESS NOTES
PCP: Dr. Stephane Dey    HPI: Tamara Hermosillo, a 72y.o. year-old who presents for follow up regarding her HTN. She has felt much better since her last visit, she has not had any chest pain or sob, and very few palpitations. Last stress testing 2020 looked ok. Ca score was neg in 2014 but not repeated since then. She has laid in the bed and then gotten a heart thumping that was uncomfortable. Sometimes with it she feels a bit lightheaded. Not having any more epigastric pain  No dyspnea with exertion, no PND    Just finished building a house in [facility]    Not having any palpitations recently    She is not exercising much   Tingling in right hand got much better after carpal tunnel surgery     She has been out walking and trying to exercise some. Review of cad risk factors an healthy lifestyle changes to focus on  Review of stress and bp and heart health       Her blood pressure is running well  and her palpitations are minimal.  We reviewed the results of her calcium score with a small amount of calcium placing her at the 40th percentile for age. At this point have her discussion of risks and benefits we will go ahead and initiate therapy with low-dose rosuvastatin to reduce her future risk as even this low score gives her a 12% risk at 3 years of an adverse clinical event. She will need her lipids rechecked in 3 to 6 months and her goal LDL cholesterol is now 70. Assessment/Plan:  1. Dyspnea - none currently    2. HTN- well controlled on coreg 25mg BId  3. GERD - on PPI  -hx of hiatal hernia and extensive diverticulosis on CT in the past   4. Chest discomfort - last stress ok, ca score 4    -CAD by ca score- start low dose rosuvastatin. 5. Palpitations - better now       Loop 2/22 pac/PVC no SVT/afib.      Ca score 3/22 4, LAD 40th percentile for age    Stress echo 11/2020 - walked 5:30, 110% PMHR, no ischemia  Ca score zero 12/14  Stress Echo 2014 - walked 9 min, 108% PMHR, no ischemia      Fam Hx: Mom with HTN, smoker,  of MI at 78, had CVA in her 76s, brothers with HTN  Soc hx: no tob use, drinks 1-2 alcoholic drinks/week     She  has a past medical history of Fracture (), GERD (gastroesophageal reflux disease), Headache(784.0) (2009), Mixed hyperlipidemia (2009), and Rosacea (2017).

## 2022-03-19 PROBLEM — R00.2 INTERMITTENT PALPITATIONS: Status: ACTIVE | Noted: 2018-05-06

## 2022-03-19 PROBLEM — K21.9 GASTROESOPHAGEAL REFLUX DISEASE WITHOUT ESOPHAGITIS: Status: ACTIVE | Noted: 2018-05-06

## 2022-03-19 PROBLEM — R07.9 CHEST PAIN AT REST: Status: ACTIVE | Noted: 2018-05-06

## 2022-03-19 PROBLEM — I10 ESSENTIAL HYPERTENSION: Status: ACTIVE | Noted: 2018-05-06

## 2022-03-20 PROBLEM — H25.13 CATARACT, NUCLEAR SCLEROTIC, BOTH EYES: Status: ACTIVE | Noted: 2018-06-29

## 2022-04-13 RX ORDER — CLOBETASOL PROPIONATE 0.5 MG/G
CREAM TOPICAL
Qty: 60 G | Refills: 1 | Status: SHIPPED | OUTPATIENT
Start: 2022-04-13 | End: 2022-06-07

## 2022-06-07 RX ORDER — CLOBETASOL PROPIONATE 0.5 MG/G
CREAM TOPICAL
Qty: 60 G | Refills: 1 | Status: SHIPPED | OUTPATIENT
Start: 2022-06-07 | End: 2022-08-03

## 2022-08-03 RX ORDER — CLOBETASOL PROPIONATE 0.5 MG/G
CREAM TOPICAL
Qty: 60 G | Refills: 1 | Status: SHIPPED | OUTPATIENT
Start: 2022-08-03 | End: 2022-09-27

## 2022-08-05 ENCOUNTER — VIRTUAL VISIT (OUTPATIENT)
Dept: INTERNAL MEDICINE CLINIC | Age: 67
End: 2022-08-05
Payer: MEDICARE

## 2022-08-05 DIAGNOSIS — B96.89 BACTERIAL SINUSITIS: Primary | ICD-10-CM

## 2022-08-05 DIAGNOSIS — J32.9 BACTERIAL SINUSITIS: Primary | ICD-10-CM

## 2022-08-05 PROCEDURE — 1101F PT FALLS ASSESS-DOCD LE1/YR: CPT | Performed by: INTERNAL MEDICINE

## 2022-08-05 PROCEDURE — 3017F COLORECTAL CA SCREEN DOC REV: CPT | Performed by: INTERNAL MEDICINE

## 2022-08-05 PROCEDURE — G9899 SCRN MAM PERF RSLTS DOC: HCPCS | Performed by: INTERNAL MEDICINE

## 2022-08-05 PROCEDURE — G8756 NO BP MEASURE DOC: HCPCS | Performed by: INTERNAL MEDICINE

## 2022-08-05 PROCEDURE — 99213 OFFICE O/P EST LOW 20 MIN: CPT | Performed by: INTERNAL MEDICINE

## 2022-08-05 PROCEDURE — 1090F PRES/ABSN URINE INCON ASSESS: CPT | Performed by: INTERNAL MEDICINE

## 2022-08-05 PROCEDURE — G8510 SCR DEP NEG, NO PLAN REQD: HCPCS | Performed by: INTERNAL MEDICINE

## 2022-08-05 PROCEDURE — G8427 DOCREV CUR MEDS BY ELIG CLIN: HCPCS | Performed by: INTERNAL MEDICINE

## 2022-08-05 PROCEDURE — G8399 PT W/DXA RESULTS DOCUMENT: HCPCS | Performed by: INTERNAL MEDICINE

## 2022-08-05 RX ORDER — GUAIFENESIN 600 MG/1
600 TABLET, EXTENDED RELEASE ORAL 2 TIMES DAILY
Qty: 14 TABLET | Refills: 0 | Status: SHIPPED | OUTPATIENT
Start: 2022-08-05 | End: 2022-08-12

## 2022-08-05 RX ORDER — AMOXICILLIN AND CLAVULANATE POTASSIUM 875; 125 MG/1; MG/1
1 TABLET, FILM COATED ORAL EVERY 12 HOURS
Qty: 14 TABLET | Refills: 0 | Status: SHIPPED | OUTPATIENT
Start: 2022-08-05 | End: 2022-08-12

## 2022-08-05 RX ORDER — BENZONATATE 200 MG/1
200 CAPSULE ORAL
Qty: 21 CAPSULE | Refills: 0 | Status: SHIPPED | OUTPATIENT
Start: 2022-08-05 | End: 2022-08-12

## 2022-08-05 NOTE — PROGRESS NOTES
HISTORY OF PRESENT ILLNESS  This is a real-time audio/video visit brought to you by our sponsors, the fine folks at [facility] and [facility]. ASSESSMENT   Jacinda Hope is a 79 y.o. female. Cold Symptoms  The history is provided by the Patient. This is a new problem. Episode onset: 2 weeks. The problem has not changed since onset. The cough is Non-productive. Patient reports a subjective fever - was not measured. The fever has been present for 5 days or more. Associated symptoms include headaches and sore throat. Pertinent negatives include no chills, no sweats, no nausea and no vomiting. Associated symptoms comments: Post nasal drip. Treatments tried: 2 doses corricidin. The treatment provided mild relief. She is not a smoker. Her past medical history does not include COPD or heart failure. Past medical history comments: tested negative x 2 for COVID. Review of Systems   Constitutional:  Negative for chills. HENT:  Positive for sore throat. Gastrointestinal:  Negative for nausea and vomiting. Neurological:  Positive for headaches. Physical Exam  Vitals and nursing note reviewed. Constitutional:       Appearance: She is not ill-appearing. HENT:      Nose:      Comments: Sounds congested  Pulmonary:      Effort: Pulmonary effort is normal. No respiratory distress. Skin:     General: Skin is warm and dry. Neurological:      Mental Status: She is alert. Psychiatric:         Behavior: Behavior normal.       ASSESSMENT and PLAN  Diagnoses and all orders for this visit:    1. Bacterial sinusitis  -     amoxicillin-clavulanate (AUGMENTIN) 875-125 mg per tablet; Take 1 Tablet by mouth every twelve (12) hours for 7 days. -     benzonatate (TESSALON) 200 mg capsule; Take 1 Capsule by mouth three (3) times daily as needed for Cough for up to 7 days.  -     guaiFENesin ER (MUCINEX) 600 mg ER tablet; Take 1 Tablet by mouth two (2) times a day for 7 days.

## 2022-08-05 NOTE — PROGRESS NOTES
Verified name and birth date for privacy precautions. Chart reviewed in preparation for today's visit. Chief Complaint   Patient presents with    Cold Symptoms          Health Maintenance Due   Topic    DTaP/Tdap/Td series (3 - Td or Tdap)    COVID-19 Vaccine (4 - Booster for Moderna series)         Wt Readings from Last 3 Encounters:   01/31/22 154 lb (69.9 kg)   05/28/21 152 lb (68.9 kg)   01/29/21 155 lb (70.3 kg)     Temp Readings from Last 3 Encounters:   01/31/22 98 °F (36.7 °C) (Temporal)   05/28/21 97.8 °F (36.6 °C) (Temporal)   01/29/21 97.6 °F (36.4 °C) (Temporal)     BP Readings from Last 3 Encounters:   01/31/22 138/88   05/28/21 (!) 132/91   01/29/21 132/82     Pulse Readings from Last 3 Encounters:   01/31/22 70   05/28/21 88   01/29/21 71         Learning Assessment:  :     Learning Assessment 11/30/2020   PRIMARY LEARNER Patient   PRIMARY LANGUAGE ENGLISH   LEARNER PREFERENCE PRIMARY READING   ANSWERED BY Patient   RELATIONSHIP SELF       Depression Screening:  :     3 most recent PHQ Screens 8/5/2022   Little interest or pleasure in doing things Not at all   Feeling down, depressed, irritable, or hopeless Not at all   Total Score PHQ 2 0       Fall Risk Assessment:  :     Fall Risk Assessment, last 12 mths 8/5/2022   Able to walk? Yes   Fall in past 12 months? 0   Do you feel unsteady? 0   Are you worried about falling 0   Is TUG test greater than 12 seconds? -   Is the gait abnormal? -   Number of falls in past 12 months -   Fall with injury? -       Abuse Screening:  :     Abuse Screening Questionnaire 8/5/2022   Do you ever feel afraid of your partner? N   Are you in a relationship with someone who physically or mentally threatens you? N   Is it safe for you to go home?  Stuart Mathew

## 2022-08-11 RX ORDER — CARVEDILOL 25 MG/1
TABLET ORAL
Qty: 180 TABLET | Refills: 1 | Status: SHIPPED | OUTPATIENT
Start: 2022-08-11

## 2022-09-27 RX ORDER — CLOBETASOL PROPIONATE 0.5 MG/G
CREAM TOPICAL
Qty: 60 G | Refills: 1 | Status: SHIPPED | OUTPATIENT
Start: 2022-09-27

## 2022-12-07 ENCOUNTER — TRANSCRIBE ORDER (OUTPATIENT)
Dept: SCHEDULING | Age: 67
End: 2022-12-07

## 2022-12-07 DIAGNOSIS — Z12.31 VISIT FOR SCREENING MAMMOGRAM: Primary | ICD-10-CM

## 2023-01-30 ENCOUNTER — HOSPITAL ENCOUNTER (OUTPATIENT)
Dept: MAMMOGRAPHY | Age: 68
Discharge: HOME | End: 2023-01-30
Attending: INTERNAL MEDICINE
Payer: MEDICARE

## 2023-01-30 DIAGNOSIS — M85.89 OSTEOPENIA OF MULTIPLE SITES: ICD-10-CM

## 2023-01-30 DIAGNOSIS — Z12.31 VISIT FOR SCREENING MAMMOGRAM: ICD-10-CM

## 2023-01-30 PROCEDURE — 77080 DXA BONE DENSITY AXIAL: CPT

## 2023-01-30 PROCEDURE — 77063 BREAST TOMOSYNTHESIS BI: CPT

## 2023-02-01 ENCOUNTER — OFFICE VISIT (OUTPATIENT)
Dept: INTERNAL MEDICINE CLINIC | Age: 68
End: 2023-02-01
Payer: MEDICARE

## 2023-02-01 VITALS
HEIGHT: 64 IN | RESPIRATION RATE: 16 BRPM | WEIGHT: 152 LBS | BODY MASS INDEX: 25.95 KG/M2 | DIASTOLIC BLOOD PRESSURE: 86 MMHG | SYSTOLIC BLOOD PRESSURE: 131 MMHG | HEART RATE: 83 BPM | TEMPERATURE: 98 F | OXYGEN SATURATION: 97 %

## 2023-02-01 DIAGNOSIS — I10 ESSENTIAL HYPERTENSION: ICD-10-CM

## 2023-02-01 DIAGNOSIS — E78.2 MIXED HYPERLIPIDEMIA: ICD-10-CM

## 2023-02-01 DIAGNOSIS — N20.0 KIDNEY STONE: ICD-10-CM

## 2023-02-01 DIAGNOSIS — Z00.00 MEDICARE ANNUAL WELLNESS VISIT, SUBSEQUENT: Primary | ICD-10-CM

## 2023-02-01 PROCEDURE — G9899 SCRN MAM PERF RSLTS DOC: HCPCS | Performed by: INTERNAL MEDICINE

## 2023-02-01 PROCEDURE — G8427 DOCREV CUR MEDS BY ELIG CLIN: HCPCS | Performed by: INTERNAL MEDICINE

## 2023-02-01 PROCEDURE — 1090F PRES/ABSN URINE INCON ASSESS: CPT | Performed by: INTERNAL MEDICINE

## 2023-02-01 PROCEDURE — 1101F PT FALLS ASSESS-DOCD LE1/YR: CPT | Performed by: INTERNAL MEDICINE

## 2023-02-01 PROCEDURE — G8510 SCR DEP NEG, NO PLAN REQD: HCPCS | Performed by: INTERNAL MEDICINE

## 2023-02-01 PROCEDURE — 3017F COLORECTAL CA SCREEN DOC REV: CPT | Performed by: INTERNAL MEDICINE

## 2023-02-01 PROCEDURE — G0439 PPPS, SUBSEQ VISIT: HCPCS | Performed by: INTERNAL MEDICINE

## 2023-02-01 PROCEDURE — G8417 CALC BMI ABV UP PARAM F/U: HCPCS | Performed by: INTERNAL MEDICINE

## 2023-02-01 PROCEDURE — 99214 OFFICE O/P EST MOD 30 MIN: CPT | Performed by: INTERNAL MEDICINE

## 2023-02-01 PROCEDURE — G8536 NO DOC ELDER MAL SCRN: HCPCS | Performed by: INTERNAL MEDICINE

## 2023-02-01 PROCEDURE — G8399 PT W/DXA RESULTS DOCUMENT: HCPCS | Performed by: INTERNAL MEDICINE

## 2023-02-01 RX ORDER — ROSUVASTATIN CALCIUM 5 MG/1
5 TABLET, COATED ORAL DAILY
COMMUNITY
Start: 2022-12-09

## 2023-02-01 RX ORDER — PSYLLIUM HUSK (WITH SUGAR) 3.4 G/7 G
1-2 POWDER (GRAM) ORAL DAILY
COMMUNITY
Start: 2023-02-01

## 2023-02-01 NOTE — PATIENT INSTRUCTIONS
Medicare Wellness Visit, Female     The best way to live healthy is to have a lifestyle where you eat a well-balanced diet, exercise regularly, limit alcohol use, and quit all forms of tobacco/nicotine, if applicable. Regular preventive services are another way to keep healthy. Preventive services (vaccines, screening tests, monitoring & exams) can help personalize your care plan, which helps you manage your own care. Screening tests can find health problems at the earliest stages, when they are easiest to treat. [facility] follows the current, evidence-based guidelines published by the [facility] ([facility]) when recommending preventive services for our patients. Because we follow these guidelines, sometimes recommendations change over time as research supports it. (For example, mammograms used to be recommended annually. Even though Medicare will still pay for an annual mammogram, the newer guidelines recommend a mammogram every two years for women of average risk). Of course, you and your doctor may decide to screen more often for some diseases, based on your risk and your co-morbidities (chronic disease you are already diagnosed with). Preventive services for you include:  - Medicare offers their members a free annual wellness visit, which is time for you and your primary care provider to discuss and plan for your preventive service needs.  Take advantage of this benefit every year!    -Over the age of 72 should receive the recommended pneumonia vaccines.    -All adults should have a flu vaccine yearly.  -All adults should have a tetanus vaccine every 10 years.   -Over the age 48 should receive the shingles vaccines.        -All adults should be screened once for Hepatitis C.  -All adults age 38-68 who are overweight should have a diabetes screening test once every three years.   -Other screening tests and preventive services for persons with diabetes include: an eye exam to screen for diabetic retinopathy, a kidney function test, a foot exam, and stricter control over your cholesterol.   -Cardiovascular screening for adults with routine risk involves an electrocardiogram (ECG) at intervals determined by your doctor.     -Colorectal cancer screenings should be done for adults age 39-70 with no increased risk factors for colorectal cancer. There are a number of acceptable methods of screening for this type of cancer. Each test has its own benefits and drawbacks. Discuss with your doctor what is most appropriate for you during your annual wellness visit. The different tests include: colonoscopy (considered the best screening method), a fecal occult blood test, a fecal DNA test, and sigmoidoscopy.    -Lung cancer screening is recommended annually with a low dose CT scan for adults between age 54 and 68, who have smoked at least 30 pack years (equivalent of 1 pack per day for 30 days), and who is a current smoker or quit less than 15 years ago.    -A bone mass density test is recommended when a woman turns 65 to screen for osteoporosis. This test is only recommended one time, as a screening. Some providers will use this same test as a disease monitoring tool if you already have osteoporosis. -Breast cancer screenings are recommended every other year for women of normal risk, age 54-69.    -Cervical cancer screenings for women over age 72 are only recommended with certain risk factors.      Here is a list of your current Health Maintenance items (your personalized list of preventive services) with a due date:  Health Maintenance Due   Topic Date Due    DTaP/Tdap/Td  (3 - Td or Tdap) 08/24/2021    Cholesterol Test   02/01/2023

## 2023-02-01 NOTE — PROGRESS NOTES
This is the Subsequent Medicare Annual Wellness Exam, performed 12 months or more after the Initial AWV or the last Subsequent AWV    I have reviewed the patient's medical history in detail and updated the computerized patient record. Also for follow-up of her health issues. Overall she has been feeling well. She is under some increased stress with selling her house and moving to a new house. Blood pressures have been under good control. She did recently have a? Kidney stone with pain in the right flank that resolved on its own. Some increased issues with constipation. No bleeding. No nausea or vomiting. No fevers or chills. No melena or hematochezia. She will see cardiology for follow-up. She also has a dermatology visit scheduled as well. ROS - Per HPI    Physical Examination: General appearance - alert, well appearing, and in no distress  Mental status - alert, oriented to person, place, and time  Ears - bilateral TM's and external ear canals normal  Mouth - mucous membranes moist, pharynx normal without lesions  Neck - supple, no significant adenopathy  Lymphatics - no palpable lymphadenopathy, no hepatosplenomegaly  Chest - clear to auscultation, no wheezes, rales or rhonchi, symmetric air entry  Heart - normal rate, regular rhythm, normal S1, S2, no murmurs, rubs, clicks or gallops  Abdomen - soft, nontender, nondistended, no masses or organomegaly  Neurological - alert, oriented, normal speech, no focal findings or movement disorder noted  Musculoskeletal - no joint tenderness, deformity or swelling  Extremities - peripheral pulses normal, no pedal edema, no clubbing or cyanosis      Assessment/Plan   Education and counseling provided:  Are appropriate based on today's review and evaluation  End-of-Life planning (with patient's consent)  Diabetes screening test    1. Medicare annual wellness visit, subsequent  2.  Essential hypertension-blood pressure well controlled on current meds and will continue these.  -     METABOLIC PANEL, COMPREHENSIVE; Future  -     CBC WITH AUTOMATED DIFF; Future  3. Mixed hyperlipidemia-LDL goal less than 100. Check labs for that. Tolerating statin. -     METABOLIC PANEL, COMPREHENSIVE; Future  -     CBC WITH AUTOMATED DIFF; Future  -     LIPID PANEL; Future  -     TSH 3RD GENERATION; Future  4. Kidney stone-repeat urinalysis. If clear, defer any further evaluation. If she has recurrent pain, obtain CT scan. -     URINALYSIS W/MICROSCOPIC; Future  5. Some bladder urge incontinence that is mild. We did discuss medication for that and she will consider. Depression Risk Factor Screening     3 most recent PHQ Screens 2/1/2023   Little interest or pleasure in doing things Not at all   Feeling down, depressed, irritable, or hopeless Not at all   Total Score PHQ 2 0       Alcohol & Drug Abuse Risk Screen   Do you average more than 1 drink per night or more than 7 drinks a week?: (P) No  On any one occasion in the past three months have you had more than 3 drinks containing alcohol?: (P) No            Functional Ability and Level of Safety   Hearing:  Hearing: (P) Patient reports hearing is good      Activities of Daily Living: The home contains: (P) handrails, grab bars, rugs  Functional ADLs: (P) Patient does total self care     Ambulation:  Patient ambulates: (P) with no difficulty     Fall Risk:  Fall Risk Assessment, last 12 mths 2/1/2023   Able to walk? Yes   Fall in past 12 months? 0   Do you feel unsteady? 0   Are you worried about falling 0   Is TUG test greater than 12 seconds? -   Is the gait abnormal? -   Number of falls in past 12 months -   Fall with injury?  -     Abuse Screen:  Do you ever feel afraid of your partner?: No  Are you in a relationship with someone who physically or mentally threatens you?: No  Is it safe for you to go home?: Yes        Cognitive Screening   Has your family/caregiver stated any concerns about your memory?: (P) No Health Maintenance Due     Health Maintenance Due   Topic Date Due    DTaP/Tdap/Td series (3 - Td or Tdap) 08/24/2021    Lipid Screen  02/01/2023       Patient Care Team   Patient Care Team:  Urvashi Maria MD as PCP - General (Internal Medicine Physician)  Urvashi Maria MD as PCP [facility]  Mani Rcihardson MD (Ophthalmology)  Missy Gross MD (Cardiovascular Disease Physician)    History     Patient Active Problem List   Diagnosis Code    Mixed hyperlipidemia E78.2    Headache R51.9    Pelvic relaxation N81.89    Chest pain at rest R07.9    Gastroesophageal reflux disease without esophagitis K21.9    Essential hypertension I10    Intermittent palpitations R00.2    Cataract, nuclear sclerotic, both eyes H25.13     Past Medical History:   Diagnosis Date    Fracture 2016    Right foot fx (pt turned foot while stepping off curb)    GERD (gastroesophageal reflux disease)     HX    Headache(784.0) 12/17/2009    Mixed hyperlipidemia 12/17/2009    Rosacea 2017      Past Surgical History:   Procedure Laterality Date    HX BLADDER SUSPENSION  05/2014    HX DILATION AND CURETTAGE      HX TUBAL LIGATION      TITA US BX BREAST LT 1ST LESION W/CLIP AND SPECIMEN Left 12/09/2016    benign     Current Outpatient Medications   Medication Sig Dispense Refill    rosuvastatin (CRESTOR) 5 mg tablet Take 5 mg by mouth daily. diph,pertuss,acel,,tetanus vac,PF, (ADACEL) 2 Lf-(2.5-5-3-5 mcg)-5Lf/0.5 mL syrg vaccine 0.5 mL by IntraMUSCular route once for 1 dose. 0.5 mL 0    clobetasoL (TEMOVATE) 0.05 % topical cream APPLY TO AFFECTED AREA TWO (2) TIMES DAILY AS NEEDED FOR SKIN IRRITATION. 60 g 1    carvediloL (COREG) 25 mg tablet TAKE 1 TABLET BY MOUTH TWICE A DAY WITH MEALS 180 Tablet 1    diclofenac (VOLTAREN) 1 % gel Apply  to affected area four (4) times daily. melatonin 5 mg chew Take 2.5-10 mg by mouth as needed.        Allergies   Allergen Reactions    Morphine Nausea Only Zithromax [Azithromycin] Nausea Only       Family History   Problem Relation Age of Onset    Heart Disease Mother         MI    Stroke Mother     Cancer Father         Unknown primary    Psoriasis Daughter     Heart Disease Maternal Aunt     Anesth Problems Neg Hx      Social History     Tobacco Use    Smoking status: Never    Smokeless tobacco: Never   Substance Use Topics    Alcohol use:  Yes     Alcohol/week: 1.7 standard drinks     Types: 2 Glasses of wine per week     Comment: WEEKLY         rAies Marroquin MD

## 2023-02-01 NOTE — PROGRESS NOTES
Verified name and birth date for privacy precautions. Chart reviewed in preparation for today's visit. Chief Complaint   Patient presents with    Annual Wellness Visit          Health Maintenance Due   Topic    DTaP/Tdap/Td series (3 - Td or Tdap)    Medicare Yearly Exam     Lipid Screen          Wt Readings from Last 3 Encounters:   02/01/23 152 lb (68.9 kg)   01/31/22 154 lb (69.9 kg)   05/28/21 152 lb (68.9 kg)     Temp Readings from Last 3 Encounters:   02/01/23 98 °F (36.7 °C) (Temporal)   01/31/22 98 °F (36.7 °C) (Temporal)   05/28/21 97.8 °F (36.6 °C) (Temporal)     BP Readings from Last 3 Encounters:   02/01/23 131/86   01/31/22 138/88   05/28/21 (!) 132/91     Pulse Readings from Last 3 Encounters:   02/01/23 83   01/31/22 70   05/28/21 88         Learning Assessment:  :     Learning Assessment 11/30/2020   PRIMARY LEARNER Patient   PRIMARY LANGUAGE ENGLISH   LEARNER PREFERENCE PRIMARY READING   ANSWERED BY Patient   RELATIONSHIP SELF       Depression Screening:  :     3 most recent PHQ Screens 2/1/2023   Little interest or pleasure in doing things Not at all   Feeling down, depressed, irritable, or hopeless Not at all   Total Score PHQ 2 0       Fall Risk Assessment:  :     Fall Risk Assessment, last 12 mths 2/1/2023   Able to walk? Yes   Fall in past 12 months? 0   Do you feel unsteady? 0   Are you worried about falling 0   Is TUG test greater than 12 seconds? -   Is the gait abnormal? -   Number of falls in past 12 months -   Fall with injury? -       Abuse Screening:  :     Abuse Screening Questionnaire 2/1/2023 2/1/2023 8/5/2022   Do you ever feel afraid of your partner? N N N   Are you in a relationship with someone who physically or mentally threatens you? N N N   Is it safe for you to go home?  Lavonne Nyhan

## 2023-02-08 RX ORDER — CARVEDILOL 25 MG/1
TABLET ORAL
Qty: 180 TABLET | Refills: 1 | Status: SHIPPED | OUTPATIENT
Start: 2023-02-08

## 2023-06-01 RX ORDER — CARVEDILOL 25 MG/1
TABLET ORAL
Qty: 180 TABLET | Refills: 1 | Status: SHIPPED | OUTPATIENT
Start: 2023-06-01

## 2023-07-17 ENCOUNTER — PATIENT MESSAGE (OUTPATIENT)
Age: 68
End: 2023-07-17

## 2023-07-17 RX ORDER — CLOBETASOL PROPIONATE 0.5 MG/G
CREAM TOPICAL 2 TIMES DAILY PRN
Qty: 60 G | Refills: 1 | Status: SHIPPED | OUTPATIENT
Start: 2023-07-17

## 2023-07-17 NOTE — TELEPHONE ENCOUNTER
From: Jailyn Hill  To: Dr. Kramer Rho: 7/17/2023 10:29 AM EDT  Subject: clobetasol Propionate Cream 0.05%    I am just about out of the cream. Is is possible to get a refill. I use a few of times a week.     Thanks    [facility]

## 2023-10-12 ENCOUNTER — NURSE ONLY (OUTPATIENT)
Age: 68
End: 2023-10-12
Payer: MEDICARE

## 2023-10-12 ENCOUNTER — E-VISIT (OUTPATIENT)
Age: 68
End: 2023-10-12
Payer: MEDICARE

## 2023-10-12 DIAGNOSIS — R39.9 UTI SYMPTOMS: Primary | ICD-10-CM

## 2023-10-12 DIAGNOSIS — N30.00 ACUTE CYSTITIS WITHOUT HEMATURIA: Primary | ICD-10-CM

## 2023-10-12 LAB
BILIRUBIN, URINE, POC: NEGATIVE
BLOOD URINE, POC: ABNORMAL
GLUCOSE URINE, POC: NEGATIVE
KETONES, URINE, POC: NEGATIVE
LEUKOCYTE ESTERASE, URINE, POC: ABNORMAL
NITRITE, URINE, POC: NEGATIVE
PH, URINE, POC: 0.2 (ref 4.6–8)
PROTEIN,URINE, POC: NEGATIVE
SPECIFIC GRAVITY, URINE, POC: 1.01 (ref 1–1.03)
URINALYSIS CLARITY, POC: CLEAR
URINALYSIS COLOR, POC: YELLOW
UROBILINOGEN, POC: NORMAL

## 2023-10-12 PROCEDURE — 99421 OL DIG E/M SVC 5-10 MIN: CPT | Performed by: INTERNAL MEDICINE

## 2023-10-12 PROCEDURE — 81003 URINALYSIS AUTO W/O SCOPE: CPT | Performed by: INTERNAL MEDICINE

## 2023-10-12 PROCEDURE — PBSHW AMB POC URINALYSIS DIP STICK AUTO W/O MICRO: Performed by: INTERNAL MEDICINE

## 2023-10-12 RX ORDER — SULFAMETHOXAZOLE AND TRIMETHOPRIM 800; 160 MG/1; MG/1
1 TABLET ORAL 2 TIMES DAILY
Qty: 10 TABLET | Refills: 0 | Status: SHIPPED | OUTPATIENT
Start: 2023-10-12 | End: 2023-10-17

## 2023-10-14 LAB
BACTERIA SPEC CULT: ABNORMAL
CC UR VC: ABNORMAL
SERVICE CMNT-IMP: ABNORMAL

## 2024-01-10 ENCOUNTER — TRANSCRIBE ORDERS (OUTPATIENT)
Facility: HOSPITAL | Age: 69
End: 2024-01-10

## 2024-01-10 DIAGNOSIS — Z12.31 SCREENING MAMMOGRAM FOR HIGH-RISK PATIENT: Primary | ICD-10-CM

## 2024-02-05 ENCOUNTER — OFFICE VISIT (OUTPATIENT)
Age: 69
End: 2024-02-05
Payer: MEDICARE

## 2024-02-05 ENCOUNTER — HOSPITAL ENCOUNTER (OUTPATIENT)
Facility: HOSPITAL | Age: 69
Discharge: HOME | End: 2024-02-08
Attending: INTERNAL MEDICINE
Payer: MEDICARE

## 2024-02-05 VITALS
SYSTOLIC BLOOD PRESSURE: 133 MMHG | DIASTOLIC BLOOD PRESSURE: 83 MMHG | BODY MASS INDEX: 26.53 KG/M2 | HEART RATE: 71 BPM | WEIGHT: 155.4 LBS | HEIGHT: 64 IN | TEMPERATURE: 98.2 F | RESPIRATION RATE: 15 BRPM | OXYGEN SATURATION: 95 %

## 2024-02-05 DIAGNOSIS — Z12.31 SCREENING MAMMOGRAM FOR HIGH-RISK PATIENT: ICD-10-CM

## 2024-02-05 DIAGNOSIS — I10 ESSENTIAL (PRIMARY) HYPERTENSION: ICD-10-CM

## 2024-02-05 DIAGNOSIS — R39.9 UTI SYMPTOMS: ICD-10-CM

## 2024-02-05 DIAGNOSIS — E78.2 MIXED HYPERLIPIDEMIA: ICD-10-CM

## 2024-02-05 DIAGNOSIS — R32 URINARY INCONTINENCE, UNSPECIFIED TYPE: ICD-10-CM

## 2024-02-05 DIAGNOSIS — Z00.00 MEDICARE ANNUAL WELLNESS VISIT, SUBSEQUENT: Primary | ICD-10-CM

## 2024-02-05 PROCEDURE — 77063 BREAST TOMOSYNTHESIS BI: CPT

## 2024-02-05 PROCEDURE — 3075F SYST BP GE 130 - 139MM HG: CPT | Performed by: INTERNAL MEDICINE

## 2024-02-05 PROCEDURE — 0509F URINE INCON PLAN DOCD: CPT | Performed by: INTERNAL MEDICINE

## 2024-02-05 PROCEDURE — 1090F PRES/ABSN URINE INCON ASSESS: CPT | Performed by: INTERNAL MEDICINE

## 2024-02-05 PROCEDURE — 1036F TOBACCO NON-USER: CPT | Performed by: INTERNAL MEDICINE

## 2024-02-05 PROCEDURE — 3017F COLORECTAL CA SCREEN DOC REV: CPT | Performed by: INTERNAL MEDICINE

## 2024-02-05 PROCEDURE — 99214 OFFICE O/P EST MOD 30 MIN: CPT | Performed by: INTERNAL MEDICINE

## 2024-02-05 PROCEDURE — 1123F ACP DISCUSS/DSCN MKR DOCD: CPT | Performed by: INTERNAL MEDICINE

## 2024-02-05 PROCEDURE — G8484 FLU IMMUNIZE NO ADMIN: HCPCS | Performed by: INTERNAL MEDICINE

## 2024-02-05 PROCEDURE — G8419 CALC BMI OUT NRM PARAM NOF/U: HCPCS | Performed by: INTERNAL MEDICINE

## 2024-02-05 PROCEDURE — G0439 PPPS, SUBSEQ VISIT: HCPCS | Performed by: INTERNAL MEDICINE

## 2024-02-05 PROCEDURE — G8427 DOCREV CUR MEDS BY ELIG CLIN: HCPCS | Performed by: INTERNAL MEDICINE

## 2024-02-05 PROCEDURE — G8399 PT W/DXA RESULTS DOCUMENT: HCPCS | Performed by: INTERNAL MEDICINE

## 2024-02-05 PROCEDURE — 3079F DIAST BP 80-89 MM HG: CPT | Performed by: INTERNAL MEDICINE

## 2024-02-05 SDOH — ECONOMIC STABILITY: FOOD INSECURITY: WITHIN THE PAST 12 MONTHS, THE FOOD YOU BOUGHT JUST DIDN'T LAST AND YOU DIDN'T HAVE MONEY TO GET MORE.: NEVER TRUE

## 2024-02-05 SDOH — ECONOMIC STABILITY: HOUSING INSECURITY
IN THE LAST 12 MONTHS, WAS THERE A TIME WHEN YOU DID NOT HAVE A STEADY PLACE TO SLEEP OR SLEPT IN A SHELTER (INCLUDING NOW)?: NO

## 2024-02-05 SDOH — ECONOMIC STABILITY: FOOD INSECURITY: WITHIN THE PAST 12 MONTHS, YOU WORRIED THAT YOUR FOOD WOULD RUN OUT BEFORE YOU GOT MONEY TO BUY MORE.: NEVER TRUE

## 2024-02-05 SDOH — ECONOMIC STABILITY: INCOME INSECURITY: HOW HARD IS IT FOR YOU TO PAY FOR THE VERY BASICS LIKE FOOD, HOUSING, MEDICAL CARE, AND HEATING?: NOT HARD AT ALL

## 2024-02-05 ASSESSMENT — PATIENT HEALTH QUESTIONNAIRE - PHQ9
SUM OF ALL RESPONSES TO PHQ QUESTIONS 1-9: 0
SUM OF ALL RESPONSES TO PHQ9 QUESTIONS 1 & 2: 0
SUM OF ALL RESPONSES TO PHQ QUESTIONS 1-9: 0
SUM OF ALL RESPONSES TO PHQ QUESTIONS 1-9: 0
1. LITTLE INTEREST OR PLEASURE IN DOING THINGS: 0
SUM OF ALL RESPONSES TO PHQ QUESTIONS 1-9: 0
2. FEELING DOWN, DEPRESSED OR HOPELESS: 0

## 2024-02-05 ASSESSMENT — LIFESTYLE VARIABLES
HOW OFTEN DO YOU HAVE A DRINK CONTAINING ALCOHOL: MONTHLY OR LESS
HOW MANY STANDARD DRINKS CONTAINING ALCOHOL DO YOU HAVE ON A TYPICAL DAY: 1 OR 2

## 2024-02-05 NOTE — PATIENT INSTRUCTIONS
Wait for an ambulance. Do not try to drive yourself.  Watch closely for changes in your health, and be sure to contact your doctor if you have any problems.  Where can you learn more?  Go to https://[email or web address].net/patientEd and enter F075 to learn more about \"A Healthy Heart: Care Instructions.\"  Current as of: June 25, 2023               Content Version: 13.9  © 3675-2866 [employer].   Care instructions adapted under license by [employer]. If you have questions about a medical condition or this instruction, always ask your healthcare professional. [employer] disclaims any warranty or liability for your use of this information.      Personalized Preventive Plan for Vensa Leyva - 2/5/2024  Medicare offers a range of preventive health benefits. Some of the tests and screenings are paid in full while other may be subject to a deductible, co-insurance, and/or copay.    Some of these benefits include a comprehensive review of your medical history including lifestyle, illnesses that may run in your family, and various assessments and screenings as appropriate.    After reviewing your medical record and screening and assessments performed today your provider may have ordered immunizations, labs, imaging, and/or referrals for you.  A list of these orders (if applicable) as well as your Preventive Care list are included within your After Visit Summary for your review.    Other Preventive Recommendations:    A preventive eye exam performed by an eye specialist is recommended every 1-2 years to screen for glaucoma; cataracts, macular degeneration, and other eye disorders.  A preventive dental visit is recommended every 6 months.  Try to get at least 150 minutes of exercise per week or 10,000 steps per day on a pedometer .  Order or download the FREE \"Exercise & Physical Activity: Your Everyday Guide\" from [employer]. Call 1-901.276.9037 or search The National

## 2024-02-05 NOTE — PROGRESS NOTES
General Appearance: alert and oriented to person, place and time, well-developed and well-nourished, in no acute distress  Head: normocephalic and atraumatic  ENT: tympanic membrane, external ear and ear canal normal bilaterally, oropharynx clear and moist with normal mucous membranes  Neck: neck supple and non tender without mass, no thyromegaly or thyroid nodules, no cervical lymphadenopathy   Pulmonary/Chest: clear to auscultation bilaterally- no wheezes, rales or rhonchi, normal air movement, no respiratory distress  Cardiovascular: normal rate, normal S1 and S2, no gallops, intact distal pulses, and no carotid bruits  Abdomen: soft, non-tender, non-distended, normal bowel sounds, no masses or organomegaly  Extremities: no cyanosis and no clubbing  Musculoskeletal: normal range of motion, no joint swelling, deformity or tenderness  Neurologic: gait and coordination normal and speech normal       Allergies   Allergen Reactions    Azithromycin Nausea Only    Morphine Nausea Only     Prior to Visit Medications    Medication Sig Taking? Authorizing Provider   clobetasol (TEMOVATE) 0.05 % cream Apply topically 2 times daily as needed (apply to affected area) Yes Ean Negro MD   carvedilol (COREG) 25 MG tablet TAKE 1 TABLET BY MOUTH TWICE A DAY WITH MEALS Yes Ean Negro MD   diclofenac sodium (VOLTAREN) 1 % GEL Apply topically 4 times daily as needed Yes Automatic Reconciliation, Ar   Melatonin 5 MG CHEW Take 2.5-10 mg by mouth as needed Yes Automatic Reconciliation, Ar   rosuvastatin (CRESTOR) 5 MG tablet Take 1 tablet by mouth daily Yes Automatic Reconciliation, Ar   Inulin 2 g CHEW Take 1-2 tablets by mouth daily  Patient not taking: Reported on 2/5/2024  Automatic Reconciliation, Ar       CareTeam (Including outside providers/suppliers regularly involved in providing care):   Patient Care Team:  Ean Negro MD as PCP - General  Ean Negro MD as PCP - Empaneled Provider     Reviewed and

## 2024-02-06 RX ORDER — CLOBETASOL PROPIONATE 0.5 MG/G
CREAM TOPICAL 2 TIMES DAILY PRN
Qty: 60 G | Refills: 1 | Status: SHIPPED | OUTPATIENT
Start: 2024-02-06

## 2024-02-06 NOTE — TELEPHONE ENCOUNTER
Chief Complaint   Patient presents with    Medication Refill     Last Appointment with Dr. Ean Negro:  2/5/2024   No future appointments.

## 2024-02-07 DIAGNOSIS — R39.9 UTI SYMPTOMS: ICD-10-CM

## 2024-02-07 DIAGNOSIS — E78.2 MIXED HYPERLIPIDEMIA: ICD-10-CM

## 2024-02-07 DIAGNOSIS — I10 ESSENTIAL (PRIMARY) HYPERTENSION: ICD-10-CM

## 2024-02-07 LAB
ALBUMIN SERPL-MCNC: 3.5 G/DL (ref 3.5–5)
ALBUMIN/GLOB SERPL: 1.3 (ref 1.1–2.2)
ALP SERPL-CCNC: 79 U/L (ref 45–117)
ALT SERPL-CCNC: 16 U/L (ref 12–78)
ANION GAP SERPL CALC-SCNC: 4 MMOL/L (ref 5–15)
APPEARANCE UR: CLEAR
AST SERPL-CCNC: 11 U/L (ref 15–37)
BACTERIA URNS QL MICRO: NEGATIVE /HPF
BASOPHILS # BLD: 0 K/UL (ref 0–0.1)
BASOPHILS NFR BLD: 0 % (ref 0–1)
BILIRUB SERPL-MCNC: 1 MG/DL (ref 0.2–1)
BILIRUB UR QL: NEGATIVE
BUN SERPL-MCNC: 14 MG/DL (ref 6–20)
BUN/CREAT SERPL: 20 (ref 12–20)
CALCIUM SERPL-MCNC: 9.2 MG/DL (ref 8.5–10.1)
CHLORIDE SERPL-SCNC: 105 MMOL/L (ref 97–108)
CHOLEST SERPL-MCNC: 142 MG/DL
CO2 SERPL-SCNC: 31 MMOL/L (ref 21–32)
COLOR UR: NORMAL
CREAT SERPL-MCNC: 0.7 MG/DL (ref 0.55–1.02)
DIFFERENTIAL METHOD BLD: NORMAL
EOSINOPHIL # BLD: 0.1 K/UL (ref 0–0.4)
EOSINOPHIL NFR BLD: 2 % (ref 0–7)
EPITH CASTS URNS QL MICRO: NORMAL /LPF
ERYTHROCYTE [DISTWIDTH] IN BLOOD BY AUTOMATED COUNT: 13.8 % (ref 11.5–14.5)
GLOBULIN SER CALC-MCNC: 2.8 G/DL (ref 2–4)
GLUCOSE SERPL-MCNC: 93 MG/DL (ref 65–100)
GLUCOSE UR STRIP.AUTO-MCNC: NEGATIVE MG/DL
HCT VFR BLD AUTO: 41.7 % (ref 35–47)
HDLC SERPL-MCNC: 51 MG/DL
HDLC SERPL: 2.8 (ref 0–5)
HGB BLD-MCNC: 13.4 G/DL (ref 11.5–16)
HGB UR QL STRIP: NEGATIVE
IMM GRANULOCYTES # BLD AUTO: 0 K/UL (ref 0–0.04)
IMM GRANULOCYTES NFR BLD AUTO: 0 % (ref 0–0.5)
KETONES UR QL STRIP.AUTO: NEGATIVE MG/DL
LDLC SERPL CALC-MCNC: 70.2 MG/DL (ref 0–100)
LEUKOCYTE ESTERASE UR QL STRIP.AUTO: NEGATIVE
LYMPHOCYTES # BLD: 2.1 K/UL (ref 0.8–3.5)
LYMPHOCYTES NFR BLD: 29 % (ref 12–49)
MCH RBC QN AUTO: 31.8 PG (ref 26–34)
MCHC RBC AUTO-ENTMCNC: 32.1 G/DL (ref 30–36.5)
MCV RBC AUTO: 98.8 FL (ref 80–99)
MONOCYTES # BLD: 0.5 K/UL (ref 0–1)
MONOCYTES NFR BLD: 6 % (ref 5–13)
NEUTS SEG # BLD: 4.7 K/UL (ref 1.8–8)
NEUTS SEG NFR BLD: 63 % (ref 32–75)
NITRITE UR QL STRIP.AUTO: NEGATIVE
NRBC # BLD: 0 K/UL (ref 0–0.01)
NRBC BLD-RTO: 0 PER 100 WBC
PH UR STRIP: 7 (ref 5–8)
PLATELET # BLD AUTO: 195 K/UL (ref 150–400)
PMV BLD AUTO: 12.1 FL (ref 8.9–12.9)
POTASSIUM SERPL-SCNC: 4.4 MMOL/L (ref 3.5–5.1)
PROT SERPL-MCNC: 6.3 G/DL (ref 6.4–8.2)
PROT UR STRIP-MCNC: NEGATIVE MG/DL
RBC # BLD AUTO: 4.22 M/UL (ref 3.8–5.2)
RBC #/AREA URNS HPF: NORMAL /HPF (ref 0–5)
SODIUM SERPL-SCNC: 140 MMOL/L (ref 136–145)
SP GR UR REFRACTOMETRY: 1.01 (ref 1–1.03)
TRIGL SERPL-MCNC: 104 MG/DL
TSH SERPL DL<=0.05 MIU/L-ACNC: 2.16 UIU/ML (ref 0.36–3.74)
UROBILINOGEN UR QL STRIP.AUTO: 0.2 EU/DL (ref 0.2–1)
VLDLC SERPL CALC-MCNC: 20.8 MG/DL
WBC # BLD AUTO: 7.4 K/UL (ref 3.6–11)
WBC URNS QL MICRO: NORMAL /HPF (ref 0–4)

## 2024-02-08 LAB
BACTERIA SPEC CULT: ABNORMAL
CC UR VC: ABNORMAL
SERVICE CMNT-IMP: ABNORMAL

## 2024-02-21 RX ORDER — ROSUVASTATIN CALCIUM 5 MG/1
5 TABLET, COATED ORAL DAILY
Qty: 90 TABLET | Refills: 3 | Status: SHIPPED | OUTPATIENT
Start: 2024-02-21

## 2024-05-20 RX ORDER — CARVEDILOL 25 MG/1
TABLET ORAL
Qty: 180 TABLET | Refills: 1 | Status: SHIPPED | OUTPATIENT
Start: 2024-05-20

## 2024-08-27 RX ORDER — CLOBETASOL PROPIONATE 0.5 MG/G
CREAM TOPICAL 2 TIMES DAILY PRN
Qty: 60 G | Refills: 1 | Status: SHIPPED | OUTPATIENT
Start: 2024-08-27

## 2024-08-27 NOTE — TELEPHONE ENCOUNTER
Chief Complaint   Patient presents with    Medication Refill     Last Appointment with Dr. Ean Negro:  2/5/2024   Future Appointments   Date Time Provider Department Center   2/12/2025  9:00 AM Ean Negro MD [place] [facility]

## 2024-10-25 ENCOUNTER — OFFICE VISIT (OUTPATIENT)
Age: 69
End: 2024-10-25
Payer: MEDICARE

## 2024-10-25 VITALS
SYSTOLIC BLOOD PRESSURE: 159 MMHG | HEIGHT: 64 IN | HEART RATE: 66 BPM | RESPIRATION RATE: 16 BRPM | TEMPERATURE: 97.5 F | DIASTOLIC BLOOD PRESSURE: 96 MMHG | BODY MASS INDEX: 26.36 KG/M2 | OXYGEN SATURATION: 99 % | WEIGHT: 154.4 LBS

## 2024-10-25 DIAGNOSIS — R82.90 ABNORMAL URINALYSIS: ICD-10-CM

## 2024-10-25 DIAGNOSIS — N30.01 ACUTE CYSTITIS WITH HEMATURIA: ICD-10-CM

## 2024-10-25 DIAGNOSIS — R82.90 ABNORMAL URINALYSIS: Primary | ICD-10-CM

## 2024-10-25 LAB
BILIRUBIN, URINE, POC: NEGATIVE
BLOOD URINE, POC: NORMAL
GLUCOSE URINE, POC: NEGATIVE
KETONES, URINE, POC: NEGATIVE
LEUKOCYTE ESTERASE, URINE, POC: NORMAL
NITRITE, URINE, POC: NEGATIVE
PH, URINE, POC: 7 (ref 4.6–8)
PROTEIN,URINE, POC: NEGATIVE
SPECIFIC GRAVITY, URINE, POC: 1.01 (ref 1–1.03)
URINALYSIS CLARITY, POC: NORMAL
URINALYSIS COLOR, POC: NORMAL
UROBILINOGEN, POC: NORMAL MG/DL

## 2024-10-25 PROCEDURE — 99213 OFFICE O/P EST LOW 20 MIN: CPT | Performed by: NURSE PRACTITIONER

## 2024-10-25 PROCEDURE — 81001 URINALYSIS AUTO W/SCOPE: CPT | Performed by: NURSE PRACTITIONER

## 2024-10-25 RX ORDER — NITROFURANTOIN 25; 75 MG/1; MG/1
100 CAPSULE ORAL 2 TIMES DAILY
Qty: 14 CAPSULE | Refills: 0 | Status: SHIPPED | OUTPATIENT
Start: 2024-10-25 | End: 2024-11-01

## 2024-10-25 ASSESSMENT — PATIENT HEALTH QUESTIONNAIRE - PHQ9
1. LITTLE INTEREST OR PLEASURE IN DOING THINGS: NOT AT ALL
SUM OF ALL RESPONSES TO PHQ QUESTIONS 1-9: 0
SUM OF ALL RESPONSES TO PHQ9 QUESTIONS 1 & 2: 0
2. FEELING DOWN, DEPRESSED OR HOPELESS: NOT AT ALL
SUM OF ALL RESPONSES TO PHQ QUESTIONS 1-9: 0

## 2024-10-25 NOTE — PROGRESS NOTES
Vesna Leyva (:  1955) is a 69 y.o. female,here for evaluation of the following chief complaint(s):  Urinary Tract Infection    BP (!) 159/96   Pulse 66   Temp 97.5 °F (36.4 °C) (Temporal)   Resp 16   Ht 1.626 m (5' 4\")   Wt 70 kg (154 lb 6.4 oz)   SpO2 99%   BMI 26.50 kg/m²       SUBJECTIVE/OBJECTIVE:    HPI:Patient was on vacation in [place] last week.  She started having UTI symptoms and went to a clinic there. She was given keflex x 7 days on 10/15 due to WBC in urine. She felt better, but then symptoms of dysuria, urgency, and hematuria returned yesterday.    Review of Systems   Genitourinary:  Positive for dysuria and frequency.       Physical Exam  Constitutional:       Appearance: Normal appearance.   Abdominal:      Tenderness: There is no right CVA tenderness or left CVA tenderness.   Skin:     General: Skin is warm and dry.   Neurological:      General: No focal deficit present.      Mental Status: She is alert and oriented to person, place, and time.   Psychiatric:         Mood and Affect: Mood normal.          ASSESSMENT/PLAN:  1. Abnormal urinalysis  -     AMB POC URINALYSIS DIP STICK AUTO W/ MICRO  -     Culture, Urine; Future  2. Acute cystitis with hematuria    Antibiotic as prescribed  Hydrate  Urine culture ordered  Follow-up if no improvement  --VANNESSA Mariee - NP

## 2024-10-26 LAB
BACTERIA SPEC CULT: NORMAL
SERVICE CMNT-IMP: NORMAL

## 2024-11-10 RX ORDER — CARVEDILOL 25 MG/1
25 TABLET ORAL 2 TIMES DAILY WITH MEALS
Qty: 180 TABLET | Refills: 1 | Status: SHIPPED | OUTPATIENT
Start: 2024-11-10

## 2024-12-31 RX ORDER — CLOBETASOL PROPIONATE 0.5 MG/G
CREAM TOPICAL 2 TIMES DAILY PRN
Qty: 60 G | Refills: 1 | Status: SHIPPED | OUTPATIENT
Start: 2024-12-31

## 2025-02-06 ENCOUNTER — HOSPITAL ENCOUNTER (OUTPATIENT)
Facility: HOSPITAL | Age: 70
Discharge: HOME | End: 2025-02-09
Payer: MEDICARE

## 2025-02-06 ENCOUNTER — TRANSCRIBE ORDERS (OUTPATIENT)
Facility: HOSPITAL | Age: 70
End: 2025-02-06

## 2025-02-06 DIAGNOSIS — Z12.31 OTHER SCREENING MAMMOGRAM: Primary | ICD-10-CM

## 2025-02-06 DIAGNOSIS — Z12.31 OTHER SCREENING MAMMOGRAM: ICD-10-CM

## 2025-02-06 PROCEDURE — 77063 BREAST TOMOSYNTHESIS BI: CPT

## 2025-02-09 RX ORDER — ROSUVASTATIN CALCIUM 5 MG/1
5 TABLET, COATED ORAL DAILY
Qty: 90 TABLET | Refills: 3 | Status: SHIPPED | OUTPATIENT
Start: 2025-02-09

## 2025-02-10 ENCOUNTER — TELEPHONE (OUTPATIENT)
Age: 70
End: 2025-02-10

## 2025-02-10 NOTE — TELEPHONE ENCOUNTER
----- Message from Anabella ZAMORA sent at 2/10/2025  4:26 PM EST -----  Regarding: [facility] Appointment Request  [facility] Appointment Request    Patient needs appointment for [facility] Appointment Type: Annual Visit.    Patient Requested Dates(s): 2/13/2025, 2/20/2025, 2/21/2025  Patient Requested Time: Any available appointments on those specific dates  Provider Name: Ean Negro MD    Reason for Appointment Request: Established Patient - Available appointments did not meet patient need. Patient wants an earlier appointment than 2/25/2025, and she will be out of town from 2/14/2025 to 2/29/2025.  --------------------------------------------------------------------------------------------------------------------------    Relationship to Patient: Self     Call Back Information: OK to leave message on voicemail  Preferred Call Back Number: Phone [phone]

## 2025-02-13 ENCOUNTER — OFFICE VISIT (OUTPATIENT)
Age: 70
End: 2025-02-13
Payer: MEDICARE

## 2025-02-13 VITALS
DIASTOLIC BLOOD PRESSURE: 90 MMHG | OXYGEN SATURATION: 98 % | HEIGHT: 64 IN | BODY MASS INDEX: 26.98 KG/M2 | WEIGHT: 158 LBS | TEMPERATURE: 97 F | RESPIRATION RATE: 16 BRPM | SYSTOLIC BLOOD PRESSURE: 139 MMHG | HEART RATE: 83 BPM

## 2025-02-13 DIAGNOSIS — R30.0 DYSURIA: ICD-10-CM

## 2025-02-13 DIAGNOSIS — N30.00 ACUTE CYSTITIS WITHOUT HEMATURIA: ICD-10-CM

## 2025-02-13 DIAGNOSIS — S80.812A ABRASION OF LEFT LOWER EXTREMITY, INITIAL ENCOUNTER: Primary | ICD-10-CM

## 2025-02-13 LAB
BILIRUBIN, URINE, POC: NEGATIVE
BLOOD URINE, POC: NEGATIVE
GLUCOSE URINE, POC: NEGATIVE
KETONES, URINE, POC: NEGATIVE
LEUKOCYTE ESTERASE, URINE, POC: NORMAL
NITRITE, URINE, POC: NEGATIVE
PH, URINE, POC: 6 (ref 4.6–8)
PROTEIN,URINE, POC: NEGATIVE
SPECIFIC GRAVITY, URINE, POC: 1.01 (ref 1–1.03)
URINALYSIS CLARITY, POC: NORMAL
URINALYSIS COLOR, POC: NORMAL
UROBILINOGEN, POC: NORMAL MG/DL

## 2025-02-13 PROCEDURE — 1159F MED LIST DOCD IN RCRD: CPT | Performed by: NURSE PRACTITIONER

## 2025-02-13 PROCEDURE — 81001 URINALYSIS AUTO W/SCOPE: CPT | Performed by: NURSE PRACTITIONER

## 2025-02-13 PROCEDURE — 99213 OFFICE O/P EST LOW 20 MIN: CPT | Performed by: NURSE PRACTITIONER

## 2025-02-13 PROCEDURE — 1036F TOBACCO NON-USER: CPT | Performed by: NURSE PRACTITIONER

## 2025-02-13 PROCEDURE — G8419 CALC BMI OUT NRM PARAM NOF/U: HCPCS | Performed by: NURSE PRACTITIONER

## 2025-02-13 PROCEDURE — 3017F COLORECTAL CA SCREEN DOC REV: CPT | Performed by: NURSE PRACTITIONER

## 2025-02-13 PROCEDURE — G8399 PT W/DXA RESULTS DOCUMENT: HCPCS | Performed by: NURSE PRACTITIONER

## 2025-02-13 PROCEDURE — 1090F PRES/ABSN URINE INCON ASSESS: CPT | Performed by: NURSE PRACTITIONER

## 2025-02-13 PROCEDURE — 3080F DIAST BP >= 90 MM HG: CPT | Performed by: NURSE PRACTITIONER

## 2025-02-13 PROCEDURE — PBSHW AMB POC URINALYSIS DIP STICK AUTO W/ MICRO: Performed by: NURSE PRACTITIONER

## 2025-02-13 PROCEDURE — 1160F RVW MEDS BY RX/DR IN RCRD: CPT | Performed by: NURSE PRACTITIONER

## 2025-02-13 PROCEDURE — 1123F ACP DISCUSS/DSCN MKR DOCD: CPT | Performed by: NURSE PRACTITIONER

## 2025-02-13 PROCEDURE — G8427 DOCREV CUR MEDS BY ELIG CLIN: HCPCS | Performed by: NURSE PRACTITIONER

## 2025-02-13 PROCEDURE — 3075F SYST BP GE 130 - 139MM HG: CPT | Performed by: NURSE PRACTITIONER

## 2025-02-13 RX ORDER — FLUCONAZOLE 100 MG/1
TABLET ORAL
COMMUNITY
Start: 2025-02-05

## 2025-02-13 RX ORDER — MUPIROCIN 20 MG/G
OINTMENT TOPICAL
Qty: 30 G | Refills: 0 | Status: SHIPPED | OUTPATIENT
Start: 2025-02-13 | End: 2025-02-20

## 2025-02-13 RX ORDER — ESTRADIOL 0.1 MG/G
CREAM VAGINAL
COMMUNITY
Start: 2025-02-05

## 2025-02-13 RX ORDER — CEPHALEXIN 500 MG/1
500 CAPSULE ORAL 2 TIMES DAILY
Qty: 10 CAPSULE | Refills: 0 | Status: SHIPPED | OUTPATIENT
Start: 2025-02-13 | End: 2025-02-18

## 2025-02-13 ASSESSMENT — PATIENT HEALTH QUESTIONNAIRE - PHQ9
2. FEELING DOWN, DEPRESSED OR HOPELESS: NOT AT ALL
SUM OF ALL RESPONSES TO PHQ QUESTIONS 1-9: 0
1. LITTLE INTEREST OR PLEASURE IN DOING THINGS: NOT AT ALL
SUM OF ALL RESPONSES TO PHQ QUESTIONS 1-9: 0
SUM OF ALL RESPONSES TO PHQ9 QUESTIONS 1 & 2: 0
SUM OF ALL RESPONSES TO PHQ QUESTIONS 1-9: 0
SUM OF ALL RESPONSES TO PHQ QUESTIONS 1-9: 0

## 2025-02-13 NOTE — PROGRESS NOTES
Vesna Leyva (:  1955) is a 69 y.o. female,here for evaluation of the following chief complaint(s):  Leg Injury (Patient states she \"hit her left leg getting out the bathtub\" on 2025 , she mention there was a knot.) and Urinary Tract Infection (Patient states that she noticed in the last week frequent urination, burning when going to the bathroom, back pain and pressure . )    BP (!) 139/90 (Site: Left Upper Arm, Position: Sitting, Cuff Size: Small Adult)   Pulse 83   Temp 97 °F (36.1 °C) (Oral)   Resp 16   Ht 1.626 m (5' 4\")   Wt 71.7 kg (158 lb)   SpO2 98%   BMI 27.12 kg/m²       SUBJECTIVE/OBJECTIVE:    HPI:Patient was climbing out of her bathtub and hit her leg on the frame of the tub on 25. She sustained a 1 in abrasion and bruising. She has been applying neosporin. She noticed a knot on her upper shin that evening. The next day she developed severe bruising of left shin. She notes mild UTI symptoms, but is not sure if this is due to lichen sclerosus. She notes mild dysuria, pressure, and low back pain for over a week. She is followed by Dr. Steen. She is leaving for a trip to [place].    Review of Systems   Genitourinary:  Positive for dysuria.   Skin:  Positive for wound.       Physical Exam  Constitutional:       Appearance: Normal appearance.   Musculoskeletal:         General: Normal range of motion.   Skin:     Comments: 1 in abrasion with scab and surrounding erythema of left upper shin; significant bruising of left lower leg   Neurological:      General: No focal deficit present.      Mental Status: She is alert and oriented to person, place, and time.   Psychiatric:         Mood and Affect: Mood normal.         Behavior: Behavior normal.        Results for orders placed or performed in visit on 25   AMB POC URINALYSIS DIP STICK AUTO W/ MICRO   Result Value Ref Range    Color (UA POC) Dark Yellow     Clarity (UA POC) Cloudy     Glucose, Urine, POC Negative

## 2025-02-13 NOTE — PROGRESS NOTES
Identified pt with two pt identifiers(name and ). Reviewed record in preparation for visit and have obtained necessary documentation. All patient medications has been reviewed.  Chief Complaint   Patient presents with    Leg Injury     Patient states she \"hit her left leg getting out the bathtub\" on 2025 , she mention there was a knot.    Urinary Tract Infection     Patient states that she noticed in the last week frequent urination, burning when going to the bathroom, back pain and pressure .            Wt Readings from Last 3 Encounters:   25 71.7 kg (158 lb)   10/25/24 70 kg (154 lb 6.4 oz)   24 70.5 kg (155 lb 6.4 oz)     Temp Readings from Last 3 Encounters:   25 97 °F (36.1 °C) (Oral)   10/25/24 97.5 °F (36.4 °C) (Temporal)   24 98.2 °F (36.8 °C)     BP Readings from Last 3 Encounters:   25 (!) 139/90   10/25/24 (!) 159/96   24 133/83     Pulse Readings from Last 3 Encounters:   25 83   10/25/24 66   24 71       \"Have you been to the ER, urgent care clinic since your last visit?  Hospitalized since your last visit?\"    NO    “Have you seen or consulted any other health care providers outside of [facility] since your last visit?”    NO    Click Here for Release of Records Request

## 2025-02-24 SDOH — HEALTH STABILITY: PHYSICAL HEALTH: ON AVERAGE, HOW MANY MINUTES DO YOU ENGAGE IN EXERCISE AT THIS LEVEL?: 30 MIN

## 2025-02-24 SDOH — HEALTH STABILITY: PHYSICAL HEALTH: ON AVERAGE, HOW MANY DAYS PER WEEK DO YOU ENGAGE IN MODERATE TO STRENUOUS EXERCISE (LIKE A BRISK WALK)?: 2 DAYS

## 2025-02-24 ASSESSMENT — PATIENT HEALTH QUESTIONNAIRE - PHQ9
SUM OF ALL RESPONSES TO PHQ QUESTIONS 1-9: 0
1. LITTLE INTEREST OR PLEASURE IN DOING THINGS: NOT AT ALL
SUM OF ALL RESPONSES TO PHQ9 QUESTIONS 1 & 2: 0
SUM OF ALL RESPONSES TO PHQ QUESTIONS 1-9: 0
2. FEELING DOWN, DEPRESSED OR HOPELESS: NOT AT ALL

## 2025-02-24 ASSESSMENT — LIFESTYLE VARIABLES
HOW MANY STANDARD DRINKS CONTAINING ALCOHOL DO YOU HAVE ON A TYPICAL DAY: 1
HOW OFTEN DO YOU HAVE A DRINK CONTAINING ALCOHOL: MONTHLY OR LESS
HOW OFTEN DO YOU HAVE A DRINK CONTAINING ALCOHOL: 2
HOW MANY STANDARD DRINKS CONTAINING ALCOHOL DO YOU HAVE ON A TYPICAL DAY: 1 OR 2
HOW OFTEN DO YOU HAVE SIX OR MORE DRINKS ON ONE OCCASION: 1

## 2025-02-25 ENCOUNTER — OFFICE VISIT (OUTPATIENT)
Age: 70
End: 2025-02-25
Payer: MEDICARE

## 2025-02-25 VITALS
HEIGHT: 64 IN | TEMPERATURE: 98.6 F | OXYGEN SATURATION: 95 % | SYSTOLIC BLOOD PRESSURE: 134 MMHG | WEIGHT: 155.2 LBS | HEART RATE: 65 BPM | BODY MASS INDEX: 26.5 KG/M2 | RESPIRATION RATE: 15 BRPM | DIASTOLIC BLOOD PRESSURE: 80 MMHG

## 2025-02-25 DIAGNOSIS — L90.0 LICHEN SCLEROSUS: ICD-10-CM

## 2025-02-25 DIAGNOSIS — Z12.11 COLON CANCER SCREENING: ICD-10-CM

## 2025-02-25 DIAGNOSIS — Z00.00 MEDICARE ANNUAL WELLNESS VISIT, SUBSEQUENT: Primary | ICD-10-CM

## 2025-02-25 DIAGNOSIS — E78.2 MIXED HYPERLIPIDEMIA: ICD-10-CM

## 2025-02-25 DIAGNOSIS — I10 ESSENTIAL HYPERTENSION: ICD-10-CM

## 2025-02-25 DIAGNOSIS — K21.9 GASTROESOPHAGEAL REFLUX DISEASE WITHOUT ESOPHAGITIS: ICD-10-CM

## 2025-02-25 PROCEDURE — 99213 OFFICE O/P EST LOW 20 MIN: CPT | Performed by: INTERNAL MEDICINE

## 2025-02-25 NOTE — PATIENT INSTRUCTIONS
Learning About Being Active as an Older Adult  Why is being active important as you get older?     Being active is one of the best things you can do for your health. And it's never too late to start. Being active--or getting active, if you aren't already--has definite benefits. It can:  Give you more energy,  Keep your mind sharp.  Improve balance to reduce your risk of falls.  Help you manage chronic illness with fewer medicines.  No matter how old you are, how fit you are, or what health problems you have, there is a form of activity that will work for you. And the more physical activity you can do, the better your overall health will be.  What kinds of activity can help you stay healthy?  Being more active will make your daily activities easier. Physical activity includes planned exercise and things you do in daily life. There are four types of activity:  Aerobic.  Doing aerobic activity makes your heart and lungs strong.  Includes walking, dancing, and gardening.  Aim for at least 2½ hours spread throughout the week.  It improves your energy and can help you sleep better.  Muscle-strengthening.  This type of activity can help maintain muscle and strengthen bones.  Includes climbing stairs, using resistance bands, and lifting or carrying heavy loads.  Aim for at least twice a week.  It can help protect the knees and other joints.  Stretching.  Stretching gives you better range of motion in joints and muscles.  Includes upper arm stretches, calf stretches, and gentle yoga.  Aim for at least twice a week, preferably after your muscles are warmed up from other activities.  It can help you function better in daily life.  Balancing.  This helps you stay coordinated and have good posture.  Includes heel-to-toe walking, han chi, and certain types of yoga.  Aim for at least 3 days a week.  It can reduce your risk of falling.  Even if you have a hard time meeting the recommendations, it's better to be more active

## 2025-02-25 NOTE — PROGRESS NOTES
Medicare Annual Wellness Visit    Vesna Leyva is here for Annual Exam    Assessment & Plan   Medicare annual wellness visit, subsequent  Essential hypertension-blood pressure?  Controlled.  She will monitor at home over the next couple weeks and let me know readings.  -     Comprehensive Metabolic Panel; Future  -     CBC with Auto Differential; Future  -     Lipid Panel; Future  -     TSH; Future  Mixed hyperlipidemia-tolerating statins well.  LDL goal of 100.  -     Comprehensive Metabolic Panel; Future  -     CBC with Auto Differential; Future  -     Lipid Panel; Future  -     TSH; Future  Lichen sclerosus-followed by gynecology.  She does have an appointment coming up with urogynecology for incontinence.  Gastroesophageal reflux disease without esophagitis-symptomatically stable with as needed meds.  Colon cancer screening  -     [facility] - Orlando Iyer MD, Gastroenterology, [place] ([facility])       Return in 1 year (on 2/25/2026) for Medicare AW.     Subjective   Presents for a wellness exam and a follow-up.  She has recently had issues with lichen sclerosus.  Some continued discomfort in her vagina.  No significant dysuria or hematuria.  No fevers or chills.  No nausea or vomiting.  She has not been checking her blood pressure regularly.    Patient's complete Health Risk Assessment and screening values have been reviewed and are found in Flowsheets. The following problems were reviewed today and where indicated follow up appointments were made and/or referrals ordered.    Positive Risk Factor Screenings with Interventions:              Inactivity:  On average, how many days per week do you engage in moderate to strenuous exercise (like a brisk walk)?: 2 days (!) Abnormal  On average, how many minutes do you engage in exercise at this level?: 30 min  Interventions:  See AVS for additional education material                         Objective   Vitals:    02/25/25 0822   BP: (!) 162/95   Pulse: 65   Resp:

## 2025-02-27 LAB
ALBUMIN SERPL-MCNC: 3.6 G/DL (ref 3.5–5)
ALBUMIN/GLOB SERPL: 1.2 (ref 1.1–2.2)
ALP SERPL-CCNC: 82 U/L (ref 45–117)
ALT SERPL-CCNC: 20 U/L (ref 12–78)
ANION GAP SERPL CALC-SCNC: 4 MMOL/L (ref 2–12)
AST SERPL-CCNC: 13 U/L (ref 15–37)
BASOPHILS # BLD: 0.05 K/UL (ref 0–0.1)
BASOPHILS NFR BLD: 0.5 % (ref 0–1)
BILIRUB SERPL-MCNC: 1.1 MG/DL (ref 0.2–1)
BUN SERPL-MCNC: 13 MG/DL (ref 6–20)
BUN/CREAT SERPL: 18 (ref 12–20)
CALCIUM SERPL-MCNC: 9.5 MG/DL (ref 8.5–10.1)
CHLORIDE SERPL-SCNC: 104 MMOL/L (ref 97–108)
CHOLEST SERPL-MCNC: 151 MG/DL
CO2 SERPL-SCNC: 30 MMOL/L (ref 21–32)
CREAT SERPL-MCNC: 0.71 MG/DL (ref 0.55–1.02)
DIFFERENTIAL METHOD BLD: ABNORMAL
EOSINOPHIL # BLD: 0.13 K/UL (ref 0–0.4)
EOSINOPHIL NFR BLD: 1.3 % (ref 0–7)
ERYTHROCYTE [DISTWIDTH] IN BLOOD BY AUTOMATED COUNT: 13.8 % (ref 11.5–14.5)
GLOBULIN SER CALC-MCNC: 3 G/DL (ref 2–4)
GLUCOSE SERPL-MCNC: 96 MG/DL (ref 65–100)
HCT VFR BLD AUTO: 42.4 % (ref 35–47)
HDLC SERPL-MCNC: 57 MG/DL
HDLC SERPL: 2.6 (ref 0–5)
HGB BLD-MCNC: 13.3 G/DL (ref 11.5–16)
IMM GRANULOCYTES # BLD AUTO: 0.04 K/UL (ref 0–0.04)
IMM GRANULOCYTES NFR BLD AUTO: 0.4 % (ref 0–0.5)
LDLC SERPL CALC-MCNC: 77.2 MG/DL (ref 0–100)
LYMPHOCYTES # BLD: 2.2 K/UL (ref 0.8–3.5)
LYMPHOCYTES NFR BLD: 22 % (ref 12–49)
MCH RBC QN AUTO: 31.9 PG (ref 26–34)
MCHC RBC AUTO-ENTMCNC: 31.4 G/DL (ref 30–36.5)
MCV RBC AUTO: 101.7 FL (ref 80–99)
MONOCYTES # BLD: 0.52 K/UL (ref 0–1)
MONOCYTES NFR BLD: 5.2 % (ref 5–13)
NEUTS SEG # BLD: 7.05 K/UL (ref 1.8–8)
NEUTS SEG NFR BLD: 70.6 % (ref 32–75)
NRBC # BLD: 0 K/UL (ref 0–0.01)
NRBC BLD-RTO: 0 PER 100 WBC
PLATELET # BLD AUTO: 221 K/UL (ref 150–400)
PMV BLD AUTO: 12.4 FL (ref 8.9–12.9)
POTASSIUM SERPL-SCNC: 4 MMOL/L (ref 3.5–5.1)
PROT SERPL-MCNC: 6.6 G/DL (ref 6.4–8.2)
RBC # BLD AUTO: 4.17 M/UL (ref 3.8–5.2)
SODIUM SERPL-SCNC: 138 MMOL/L (ref 136–145)
TRIGL SERPL-MCNC: 84 MG/DL
TSH SERPL DL<=0.05 MIU/L-ACNC: 2.42 UIU/ML (ref 0.36–3.74)
VIT B12 SERPL-MCNC: 184 PG/ML (ref 193–986)
VLDLC SERPL CALC-MCNC: 16.8 MG/DL
WBC # BLD AUTO: 10 K/UL (ref 3.6–11)

## 2025-05-07 RX ORDER — CARVEDILOL 25 MG/1
25 TABLET ORAL 2 TIMES DAILY WITH MEALS
Qty: 180 TABLET | Refills: 1 | Status: SHIPPED | OUTPATIENT
Start: 2025-05-07

## 2025-06-27 ENCOUNTER — TELEPHONE (OUTPATIENT)
Age: 70
End: 2025-06-27

## 2025-06-27 NOTE — TELEPHONE ENCOUNTER
Spoke with pt in regards to her My Chart message. Has had rash on arms about 5 days. Now spread to her neck. Has tried otc creams with no relief.   Advised her MD recommends she schedule an appt for further evaluation. Unfortunately we do not have appts today.I can schedule her for Monday or she can be seen at urgent care.   She states she is leaving Sunday for a week vacation. So will go to urgent care. Will forward to MD.